# Patient Record
Sex: MALE | Race: WHITE | NOT HISPANIC OR LATINO | Employment: UNEMPLOYED | URBAN - METROPOLITAN AREA
[De-identification: names, ages, dates, MRNs, and addresses within clinical notes are randomized per-mention and may not be internally consistent; named-entity substitution may affect disease eponyms.]

---

## 2019-01-11 PROCEDURE — 88305 TISSUE EXAM BY PATHOLOGIST: CPT | Performed by: PATHOLOGY

## 2019-01-14 ENCOUNTER — LAB REQUISITION (OUTPATIENT)
Dept: LAB | Facility: HOSPITAL | Age: 69
End: 2019-01-14
Payer: COMMERCIAL

## 2019-01-14 DIAGNOSIS — D49.2 NEOPLASM OF UNSPECIFIED BEHAVIOR OF BONE, SOFT TISSUE, AND SKIN: ICD-10-CM

## 2019-11-05 LAB — HBA1C MFR BLD HPLC: 7.7 %

## 2019-11-15 ENCOUNTER — OFFICE VISIT (OUTPATIENT)
Dept: UROLOGY | Facility: MEDICAL CENTER | Age: 69
End: 2019-11-15
Payer: COMMERCIAL

## 2019-11-15 VITALS
SYSTOLIC BLOOD PRESSURE: 152 MMHG | HEART RATE: 61 BPM | DIASTOLIC BLOOD PRESSURE: 90 MMHG | WEIGHT: 238 LBS | HEIGHT: 73 IN | BODY MASS INDEX: 31.54 KG/M2

## 2019-11-15 DIAGNOSIS — N20.0 KIDNEY STONE: ICD-10-CM

## 2019-11-15 DIAGNOSIS — N40.0 BPH WITHOUT OBSTRUCTION/LOWER URINARY TRACT SYMPTOMS: Primary | ICD-10-CM

## 2019-11-15 LAB
SL AMB  POCT GLUCOSE, UA: NORMAL
SL AMB LEUKOCYTE ESTERASE,UA: NORMAL
SL AMB POCT BILIRUBIN,UA: NORMAL
SL AMB POCT BLOOD,UA: NORMAL
SL AMB POCT CLARITY,UA: CLEAR
SL AMB POCT COLOR,UA: YELLOW
SL AMB POCT KETONES,UA: NORMAL
SL AMB POCT NITRITE,UA: NORMAL
SL AMB POCT PH,UA: 7
SL AMB POCT SPECIFIC GRAVITY,UA: 1.02
SL AMB POCT URINE PROTEIN: NORMAL
SL AMB POCT UROBILINOGEN: 0.2

## 2019-11-15 PROCEDURE — 99203 OFFICE O/P NEW LOW 30 MIN: CPT | Performed by: UROLOGY

## 2019-11-15 PROCEDURE — 81003 URINALYSIS AUTO W/O SCOPE: CPT | Performed by: UROLOGY

## 2019-11-15 RX ORDER — METFORMIN HYDROCHLORIDE 500 MG/1
1000 TABLET, EXTENDED RELEASE ORAL 2 TIMES DAILY
Refills: 3 | COMMUNITY
Start: 2019-08-12

## 2019-11-15 RX ORDER — TAMSULOSIN HYDROCHLORIDE 0.4 MG/1
0.4 CAPSULE ORAL EVERY EVENING
Qty: 30 CAPSULE | Refills: 1 | Status: SHIPPED | OUTPATIENT
Start: 2019-11-15 | End: 2020-01-11

## 2019-11-15 RX ORDER — AMLODIPINE BESYLATE AND BENAZEPRIL HYDROCHLORIDE 5; 10 MG/1; MG/1
1 CAPSULE ORAL DAILY
Refills: 3 | COMMUNITY
Start: 2019-08-14 | End: 2020-02-12 | Stop reason: HOSPADM

## 2019-11-15 RX ORDER — PRAVASTATIN SODIUM 40 MG
40 TABLET ORAL DAILY
Refills: 3 | COMMUNITY
Start: 2019-09-16

## 2019-11-15 RX ORDER — AZITHROMYCIN 250 MG/1
TABLET, FILM COATED ORAL
Refills: 0 | Status: ON HOLD | COMMUNITY
Start: 2019-09-09 | End: 2020-02-06 | Stop reason: ALTCHOICE

## 2019-11-15 RX ORDER — ASPIRIN 81 MG/1
81 TABLET ORAL DAILY
COMMUNITY

## 2019-11-15 NOTE — PROGRESS NOTES
Assessment/Plan:    BPH without obstruction/lower urinary tract symptoms  Minimally symptomatic  Return in 1 year  Kidney stone  History of recurrent stones  Currently asymptomatic, but passed a stone recently  Ultrasound ordered to assess stone burden  If necessary, x-rays will be ordered  Diagnoses and all orders for this visit:    BPH without obstruction/lower urinary tract symptoms  -     tamsulosin (FLOMAX) 0 4 mg; Take 1 capsule (0 4 mg total) by mouth every evening    Kidney stone  -     POCT urine dip auto non-scope  -     US retroperitoneal / kidney limited; Future    Other orders  -     amLODIPine-benazepril (LOTREL 5-10) 5-10 MG per capsule; Take 1 capsule by mouth daily  -     azithromycin (ZITHROMAX) 250 mg tablet; TAKE TWO TABLETS BY MOUTH AS ONE DOSE ON THE FIRST DAY, THEN TAKE ONE TABLET DAILY THEREAFTER  -     metFORMIN (GLUCOPHAGE-XR) 500 mg 24 hr tablet; Take 1,000 mg by mouth 2 (two) times a day  -     pravastatin (PRAVACHOL) 40 mg tablet; Take 40 mg by mouth daily  -     aspirin (ECOTRIN LOW STRENGTH) 81 mg EC tablet; Take 81 mg by mouth daily          Subjective:      Patient ID: Violet Minaya is a 71 y o  male  HPI   Ureteral Stone:  Passed a stone at work on Nov 13  Little pain  Pt told with first stone as a teen that he has a "loop" in his kidney which traps stone debris  He passes a stone every 10-15 years, 4-5 in all  Pt caught and lost the stone  Prior stones were "calcium"  No recent imaging  BPH:  He notes urinary frequency and nocturia x 2  Caffeine causes frequency  He denies other significant urinary symptoms  He denies gross hematuria, urinary tract infections or incontinence  He is taking neither medications nor supplements for his symptoms  PSA:  We will obtain a report from the patient's PCP      AUA SYMPTOM SCORE      Most Recent Value   AUA SYMPTOM SCORE   How often have you had a sensation of not emptying your bladder completely after you finished urinating? 0   How often have you had to urinate again less than two hours after you finished urinating? 2   How often have you found you stopped and started again several times when you urinate?  0   How often have you found it difficult to postpone urination? 0   How often have you had a weak urinary stream?  1   How often have you had to push or strain to begin urination? 0   How many times did you most typically get up to urinate from the time you went to bed at night until the time you got up in the morning? 2   Quality of Life: If you were to spend the rest of your life with your urinary condition just the way it is now, how would you feel about that?  3   AUA SYMPTOM SCORE  5          The following portions of the patient's history were reviewed and updated as appropriate: allergies, current medications, past family history, past medical history, past social history, past surgical history and problem list     Review of Systems   Constitutional: Negative for activity change and fatigue  Respiratory: Negative for shortness of breath and wheezing  Cardiovascular: Negative for chest pain  Hypertension  Gastrointestinal: Negative for abdominal pain  Endocrine:        NIDDM  Genitourinary: Negative for difficulty urinating, dysuria, frequency, hematuria and urgency  Musculoskeletal: Negative for back pain and gait problem  Skin: Negative  Allergic/Immunologic: Negative  Neurological: Negative  Psychiatric/Behavioral: Negative  Objective:      /90 (BP Location: Left arm, Patient Position: Sitting, Cuff Size: Adult)   Pulse 61   Ht 6' 1" (1 854 m)   Wt 108 kg (238 lb)   BMI 31 40 kg/m²          Physical Exam   Constitutional: He is oriented to person, place, and time  He appears well-developed and well-nourished  HENT:   Head: Normocephalic and atraumatic  Eyes: EOM are normal    Neck: Normal range of motion  Neck supple     Pulmonary/Chest: Effort normal    Genitourinary: Rectum normal    Genitourinary Comments: The prostate is 40 grams, firm, smooth and non-tender   Musculoskeletal: Normal range of motion  Neurological: He is alert and oriented to person, place, and time  Skin: Skin is warm and dry  Psychiatric: He has a normal mood and affect   His behavior is normal  Judgment and thought content normal

## 2019-11-16 PROBLEM — N40.0 BPH WITHOUT OBSTRUCTION/LOWER URINARY TRACT SYMPTOMS: Status: ACTIVE | Noted: 2019-11-16

## 2019-11-16 PROBLEM — N20.0 KIDNEY STONE: Status: ACTIVE | Noted: 2019-11-16

## 2019-11-16 NOTE — ASSESSMENT & PLAN NOTE
History of recurrent stones  Currently asymptomatic, but passed a stone recently  Ultrasound ordered to assess stone burden  If necessary, x-rays will be ordered

## 2019-11-19 ENCOUNTER — HOSPITAL ENCOUNTER (OUTPATIENT)
Dept: ULTRASOUND IMAGING | Facility: HOSPITAL | Age: 69
Discharge: HOME | End: 2019-11-19
Attending: UROLOGY
Payer: COMMERCIAL

## 2019-11-19 DIAGNOSIS — N20.0 KIDNEY STONE: ICD-10-CM

## 2019-11-19 PROCEDURE — 76770 US EXAM ABDO BACK WALL COMP: CPT

## 2019-11-20 ENCOUNTER — TELEPHONE (OUTPATIENT)
Dept: UROLOGY | Facility: MEDICAL CENTER | Age: 69
End: 2019-11-20

## 2019-11-20 DIAGNOSIS — N20.0 KIDNEY STONE: Primary | ICD-10-CM

## 2019-11-20 NOTE — TELEPHONE ENCOUNTER
Called central scheduling, scheduled for 11/24/19 at 3:30pm at [facility] on [address]  I called patient, confirmed this appointment and informed patient of 3 hour food fast but clear liquids ok  No questions at this time

## 2019-11-20 NOTE — TELEPHONE ENCOUNTER
----- Message from Carmelina Castleman, MD sent at 11/20/2019  8:03 AM EST -----  Renal ultrasound shows extensive stone disease in the left kidney in and stone debris in the bladder  Several benign appearing cysts are also noted  Please obtain a CT stone study prior to the patient's next visit with me  Thank you  CT scan ordered

## 2019-11-30 ENCOUNTER — HOSPITAL ENCOUNTER (OUTPATIENT)
Dept: CT IMAGING | Facility: HOSPITAL | Age: 69
Discharge: HOME | End: 2019-11-30
Attending: UROLOGY
Payer: COMMERCIAL

## 2019-11-30 DIAGNOSIS — N20.0 KIDNEY STONE: ICD-10-CM

## 2019-11-30 PROCEDURE — 74176 CT ABD & PELVIS W/O CONTRAST: CPT

## 2019-12-03 ENCOUNTER — TELEPHONE (OUTPATIENT)
Dept: UROLOGY | Facility: AMBULATORY SURGERY CENTER | Age: 69
End: 2019-12-03

## 2019-12-03 ENCOUNTER — TELEPHONE (OUTPATIENT)
Dept: UROLOGY | Facility: MEDICAL CENTER | Age: 69
End: 2019-12-03

## 2019-12-03 NOTE — TELEPHONE ENCOUNTER
----- Message from Marcelle Medina MD sent at 12/3/2019  8:31 AM EST -----  The patient has stones in his left kidney and his bladder  Ask him to make an office appointment to discuss treatment  Thank you  LM for pt to call office    Pt needs to make an appointment

## 2019-12-04 NOTE — TELEPHONE ENCOUNTER
"Requested Prescriptions   Pending Prescriptions Disp Refills     metoprolol tartrate (LOPRESSOR) 50 MG tablet [Pharmacy Med Name: METOPROLOL TARTRATE 50 MG TAB]  Last Written Prescription Date:  05/09/2018  Last Fill Quantity: 180,  # refills: 01   Last Office Visit: 5/15/2018   Future Office Visit:      180 tablet 1     Sig: TAKE 1 TABLET (50 MG) BY MOUTH 2 TIMES DAILY    Beta-Blockers Protocol Passed    11/1/2018  2:04 AM       Passed - Blood pressure under 140/90 in past 12 months    BP Readings from Last 3 Encounters:   07/31/18 128/80   05/15/18 124/84   05/09/18 140/84                Passed - Patient is age 6 or older       Passed - Recent (12 mo) or future (30 days) visit within the authorizing provider's specialty    Patient had office visit in the last 12 months or has a visit in the next 30 days with authorizing provider or within the authorizing provider's specialty.  See \"Patient Info\" tab in inbasket, or \"Choose Columns\" in Meds & Orders section of the refill encounter.                " Patient called requesting results of recent CT renal stone study  I advised him that once the doctor reviews them, he will be getting a call from a clinical staff member  He understood and did not have any further questions  He can be reached at [phone]

## 2019-12-17 NOTE — TELEPHONE ENCOUNTER
Patient called to make an appointment  I do not see anything for the month of December  Please call patient back at [phone]

## 2020-01-10 DIAGNOSIS — N40.0 BPH WITHOUT OBSTRUCTION/LOWER URINARY TRACT SYMPTOMS: ICD-10-CM

## 2020-01-11 RX ORDER — TAMSULOSIN HYDROCHLORIDE 0.4 MG/1
CAPSULE ORAL
Qty: 30 CAPSULE | Refills: 0 | Status: SHIPPED | OUTPATIENT
Start: 2020-01-11

## 2020-01-15 ENCOUNTER — OFFICE VISIT (OUTPATIENT)
Dept: UROLOGY | Facility: CLINIC | Age: 70
End: 2020-01-15
Payer: COMMERCIAL

## 2020-01-15 VITALS
WEIGHT: 237 LBS | DIASTOLIC BLOOD PRESSURE: 84 MMHG | SYSTOLIC BLOOD PRESSURE: 140 MMHG | HEIGHT: 73 IN | HEART RATE: 59 BPM | BODY MASS INDEX: 31.41 KG/M2

## 2020-01-15 DIAGNOSIS — N40.0 BPH WITHOUT OBSTRUCTION/LOWER URINARY TRACT SYMPTOMS: Primary | ICD-10-CM

## 2020-01-15 DIAGNOSIS — N20.0 KIDNEY STONE: ICD-10-CM

## 2020-01-15 DIAGNOSIS — N21.0 BLADDER STONES: ICD-10-CM

## 2020-01-15 PROCEDURE — 81003 URINALYSIS AUTO W/O SCOPE: CPT | Performed by: UROLOGY

## 2020-01-15 PROCEDURE — 99215 OFFICE O/P EST HI 40 MIN: CPT | Performed by: UROLOGY

## 2020-01-15 NOTE — H&P (VIEW-ONLY)
Assessment/Plan:    No problem-specific Assessment & Plan notes found for this encounter  Diagnoses and all orders for this visit:    BPH without obstruction/lower urinary tract symptoms    Kidney stone  -     Case request operating room: CYSTOSCOPY URETEROSCOPY WITH LITHOTRIPSY HOLMIUM LASER, RETROGRADE PYELOGRAM AND INSERTION STENT URETERAL,Cystolothopaxy; Standing  -     Case request operating room: CYSTOSCOPY URETEROSCOPY WITH LITHOTRIPSY HOLMIUM LASER, RETROGRADE PYELOGRAM AND INSERTION STENT URETERAL,Cystolothopaxy    Bladder stones    Other orders  -     Diet NPO; Sips with meds; Standing  -     Place sequential compression device; Standing  -     Comprehensive metabolic panel; Standing  -     CBC and Platelet; Standing  -     UA (URINE) with reflex to Scope; Standing  -     Electrocardiogram, 12-lead; Standing  -     ceFAZolin (ANCEF) 2,000 mg in dextrose 5 % 100 mL IVPB          Subjective:      Patient ID: Hanh Gonzalez is a 71 y o  male  HPI  Renal calculi:  Stones in left kidney are 700-1200 Hounsfield units in would be visible on KUB  There is 1 stone at the UPJ measuring approximately 15 mm and causing obstruction  Other minor calcifications in the parenchyma also noted  The patient has passed a number of ureteral stone spontaneously  The only time he required endoscopic stone removal was at the age of 16, that is about 50 years ago  This large stone needs to be fragmented to relieve the obstruction to the kidney  Bladder   calculi:  There are 2 stones in the bladder, each 800-1400 Hounsfield units which would be visible on KUB  The patient notes variability in urinary stream and at times it will abruptly slow down  This is suggestive of the stone rolling over the bladder neck causing outlet obstruction  The presence of bladder stones is generally indicative of urinary retention and outflow obstruction  The stones need to be removed endoscopically    The patient will be scheduled  The following portions of the patient's history were reviewed and updated as appropriate: allergies, current medications, past family history, past medical history, past social history, past surgical history and problem list     Review of Systems   Constitutional: Negative for activity change and fatigue  Respiratory: Negative for shortness of breath and wheezing  Cardiovascular: Negative for chest pain  Hypertension  Gastrointestinal: Negative for abdominal pain  Endocrine:        NIDDM  Genitourinary: Negative for difficulty urinating, dysuria, frequency, hematuria and urgency  Musculoskeletal: Negative for back pain and gait problem  Skin: Negative  Allergic/Immunologic: Negative  Neurological: Negative  Psychiatric/Behavioral: Negative  Objective:      /84   Pulse 59   Ht 6' 1" (1 854 m)   Wt 108 kg (237 lb)   BMI 31 27 kg/m²           Physical Exam   Constitutional: He is oriented to person, place, and time  He appears well-developed and well-nourished  HENT:   Head: Normocephalic and atraumatic  Eyes: EOM are normal    Neck: Normal range of motion  Neck supple  Cardiovascular: Normal rate and regular rhythm  Murmur heard  Blowing systolic murmur at left sternal border  Occas dropped beat  Pulmonary/Chest: Effort normal and breath sounds normal    Abdominal: Soft  There is no tenderness  Musculoskeletal: Normal range of motion  Neurological: He is alert and oriented to person, place, and time  Skin: Skin is warm and dry  Psychiatric: He has a normal mood and affect   His behavior is normal  Judgment and thought content normal

## 2020-01-15 NOTE — PROGRESS NOTES
Assessment/Plan:    No problem-specific Assessment & Plan notes found for this encounter  Diagnoses and all orders for this visit:    BPH without obstruction/lower urinary tract symptoms    Kidney stone  -     Case request operating room: CYSTOSCOPY URETEROSCOPY WITH LITHOTRIPSY HOLMIUM LASER, RETROGRADE PYELOGRAM AND INSERTION STENT URETERAL,Cystolothopaxy; Standing  -     Case request operating room: CYSTOSCOPY URETEROSCOPY WITH LITHOTRIPSY HOLMIUM LASER, RETROGRADE PYELOGRAM AND INSERTION STENT URETERAL,Cystolothopaxy    Bladder stones    Other orders  -     Diet NPO; Sips with meds; Standing  -     Place sequential compression device; Standing  -     Comprehensive metabolic panel; Standing  -     CBC and Platelet; Standing  -     UA (URINE) with reflex to Scope; Standing  -     Electrocardiogram, 12-lead; Standing  -     ceFAZolin (ANCEF) 2,000 mg in dextrose 5 % 100 mL IVPB          Subjective:      Patient ID: Jose Valdez is a 71 y o  male  HPI  Renal calculi:  Stones in left kidney are 700-1200 Hounsfield units in would be visible on KUB  There is 1 stone at the UPJ measuring approximately 15 mm and causing obstruction  Other minor calcifications in the parenchyma also noted  The patient has passed a number of ureteral stone spontaneously  The only time he required endoscopic stone removal was at the age of 16, that is about 50 years ago  This large stone needs to be fragmented to relieve the obstruction to the kidney  Bladder   calculi:  There are 2 stones in the bladder, each 800-1400 Hounsfield units which would be visible on KUB  The patient notes variability in urinary stream and at times it will abruptly slow down  This is suggestive of the stone rolling over the bladder neck causing outlet obstruction  The presence of bladder stones is generally indicative of urinary retention and outflow obstruction  The stones need to be removed endoscopically    The patient will be scheduled  The following portions of the patient's history were reviewed and updated as appropriate: allergies, current medications, past family history, past medical history, past social history, past surgical history and problem list     Review of Systems   Constitutional: Negative for activity change and fatigue  Respiratory: Negative for shortness of breath and wheezing  Cardiovascular: Negative for chest pain  Hypertension  Gastrointestinal: Negative for abdominal pain  Endocrine:        NIDDM  Genitourinary: Negative for difficulty urinating, dysuria, frequency, hematuria and urgency  Musculoskeletal: Negative for back pain and gait problem  Skin: Negative  Allergic/Immunologic: Negative  Neurological: Negative  Psychiatric/Behavioral: Negative  Objective:      /84   Pulse 59   Ht 6' 1" (1 854 m)   Wt 108 kg (237 lb)   BMI 31 27 kg/m²          Physical Exam   Constitutional: He is oriented to person, place, and time  He appears well-developed and well-nourished  HENT:   Head: Normocephalic and atraumatic  Eyes: EOM are normal    Neck: Normal range of motion  Neck supple  Cardiovascular: Normal rate and regular rhythm  Murmur heard  Blowing systolic murmur at left sternal border  Occas dropped beat  Pulmonary/Chest: Effort normal and breath sounds normal    Abdominal: Soft  There is no tenderness  Musculoskeletal: Normal range of motion  Neurological: He is alert and oriented to person, place, and time  Skin: Skin is warm and dry  Psychiatric: He has a normal mood and affect   His behavior is normal  Judgment and thought content normal

## 2020-01-15 NOTE — H&P
Assessment/Plan:    No problem-specific Assessment & Plan notes found for this encounter  Diagnoses and all orders for this visit:    BPH without obstruction/lower urinary tract symptoms    Kidney stone  -     Case request operating room: CYSTOSCOPY URETEROSCOPY WITH LITHOTRIPSY HOLMIUM LASER, RETROGRADE PYELOGRAM AND INSERTION STENT URETERAL,Cystolothopaxy; Standing  -     Case request operating room: CYSTOSCOPY URETEROSCOPY WITH LITHOTRIPSY HOLMIUM LASER, RETROGRADE PYELOGRAM AND INSERTION STENT URETERAL,Cystolothopaxy    Bladder stones    Other orders  -     Diet NPO; Sips with meds; Standing  -     Place sequential compression device; Standing  -     Comprehensive metabolic panel; Standing  -     CBC and Platelet; Standing  -     UA (URINE) with reflex to Scope; Standing  -     Electrocardiogram, 12-lead; Standing  -     ceFAZolin (ANCEF) 2,000 mg in dextrose 5 % 100 mL IVPB          Subjective:      Patient ID: James Willis is a 71 y o  male  HPI  Renal calculi:  Stones in left kidney are 700-1200 Hounsfield units in would be visible on KUB  There is 1 stone at the UPJ measuring approximately 15 mm and causing obstruction  Other minor calcifications in the parenchyma also noted  The patient has passed a number of ureteral stone spontaneously  The only time he required endoscopic stone removal was at the age of 16, that is about 50 years ago  This large stone needs to be fragmented to relieve the obstruction to the kidney  Bladder   calculi:  There are 2 stones in the bladder, each 800-1400 Hounsfield units which would be visible on KUB  The patient notes variability in urinary stream and at times it will abruptly slow down  This is suggestive of the stone rolling over the bladder neck causing outlet obstruction  The presence of bladder stones is generally indicative of urinary retention and outflow obstruction  The stones need to be removed endoscopically    The patient will be scheduled  The following portions of the patient's history were reviewed and updated as appropriate: allergies, current medications, past family history, past medical history, past social history, past surgical history and problem list     Review of Systems   Constitutional: Negative for activity change and fatigue  Respiratory: Negative for shortness of breath and wheezing  Cardiovascular: Negative for chest pain  Hypertension  Gastrointestinal: Negative for abdominal pain  Endocrine:        NIDDM  Genitourinary: Negative for difficulty urinating, dysuria, frequency, hematuria and urgency  Musculoskeletal: Negative for back pain and gait problem  Skin: Negative  Allergic/Immunologic: Negative  Neurological: Negative  Psychiatric/Behavioral: Negative  Objective:      /84   Pulse 59   Ht 6' 1" (1 854 m)   Wt 108 kg (237 lb)   BMI 31 27 kg/m²           Physical Exam   Constitutional: He is oriented to person, place, and time  He appears well-developed and well-nourished  HENT:   Head: Normocephalic and atraumatic  Eyes: EOM are normal    Neck: Normal range of motion  Neck supple  Cardiovascular: Normal rate and regular rhythm  Murmur heard  Blowing systolic murmur at left sternal border  Occas dropped beat  Pulmonary/Chest: Effort normal and breath sounds normal    Abdominal: Soft  There is no tenderness  Musculoskeletal: Normal range of motion  Neurological: He is alert and oriented to person, place, and time  Skin: Skin is warm and dry  Psychiatric: He has a normal mood and affect   His behavior is normal  Judgment and thought content normal

## 2020-01-16 ENCOUNTER — TELEPHONE (OUTPATIENT)
Dept: UROLOGY | Facility: MEDICAL CENTER | Age: 70
End: 2020-01-16

## 2020-01-17 NOTE — TELEPHONE ENCOUNTER
(s)  Belén Whitley    I spoke to the patient and scheduled his procedure for 2/4/2020 with Dr Yolanda De La Torre at [facility]      -instructions given verbally and mailed  -patient will have CBC, CMP, Urine C&S and EKG 10 days prior  -patient will stop any potentially blood thinning meds 7 days prior  -no clearances ordered  -no auth required for [facility]

## 2020-01-28 ENCOUNTER — APPOINTMENT (OUTPATIENT)
Dept: LAB | Facility: HOSPITAL | Age: 70
End: 2020-01-28
Attending: UROLOGY
Payer: COMMERCIAL

## 2020-01-28 ENCOUNTER — HOSPITAL ENCOUNTER (OUTPATIENT)
Dept: NON INVASIVE DIAGNOSTICS | Facility: HOSPITAL | Age: 70
Discharge: HOME | End: 2020-01-28
Attending: UROLOGY
Payer: COMMERCIAL

## 2020-01-28 DIAGNOSIS — N21.0 BLADDER STONES: ICD-10-CM

## 2020-01-28 DIAGNOSIS — N20.0 KIDNEY STONE: ICD-10-CM

## 2020-01-28 LAB
ALBUMIN SERPL BCP-MCNC: 4.4 G/DL (ref 3.5–5)
ALP SERPL-CCNC: 75 U/L (ref 46–116)
ALT SERPL W P-5'-P-CCNC: 50 U/L (ref 12–78)
ANION GAP SERPL CALCULATED.3IONS-SCNC: 9 MMOL/L (ref 4–13)
AST SERPL W P-5'-P-CCNC: 26 U/L (ref 5–45)
ATRIAL RATE: 69 BPM
BASOPHILS # BLD AUTO: 0.06 THOUSANDS/ΜL (ref 0–0.1)
BASOPHILS NFR BLD AUTO: 1 % (ref 0–1)
BILIRUB SERPL-MCNC: 0.52 MG/DL (ref 0.2–1)
BUN SERPL-MCNC: 24 MG/DL (ref 5–25)
CALCIUM SERPL-MCNC: 9.7 MG/DL (ref 8.3–10.1)
CHLORIDE SERPL-SCNC: 103 MMOL/L (ref 100–108)
CO2 SERPL-SCNC: 31 MMOL/L (ref 21–32)
CREAT SERPL-MCNC: 1.33 MG/DL (ref 0.6–1.3)
EOSINOPHIL # BLD AUTO: 0.37 THOUSAND/ΜL (ref 0–0.61)
EOSINOPHIL NFR BLD AUTO: 4 % (ref 0–6)
ERYTHROCYTE [DISTWIDTH] IN BLOOD BY AUTOMATED COUNT: 12.6 % (ref 11.6–15.1)
GFR SERPL CREATININE-BSD FRML MDRD: 54 ML/MIN/1.73SQ M
GLUCOSE SERPL-MCNC: 127 MG/DL (ref 65–140)
HCT VFR BLD AUTO: 47.4 % (ref 36.5–49.3)
HGB BLD-MCNC: 15 G/DL (ref 12–17)
IMM GRANULOCYTES # BLD AUTO: 0.02 THOUSAND/UL (ref 0–0.2)
IMM GRANULOCYTES NFR BLD AUTO: 0 % (ref 0–2)
LYMPHOCYTES # BLD AUTO: 1.77 THOUSANDS/ΜL (ref 0.6–4.47)
LYMPHOCYTES NFR BLD AUTO: 21 % (ref 14–44)
MCH RBC QN AUTO: 29.6 PG (ref 26.8–34.3)
MCHC RBC AUTO-ENTMCNC: 31.6 G/DL (ref 31.4–37.4)
MCV RBC AUTO: 94 FL (ref 82–98)
MONOCYTES # BLD AUTO: 0.69 THOUSAND/ΜL (ref 0.17–1.22)
MONOCYTES NFR BLD AUTO: 8 % (ref 4–12)
NEUTROPHILS # BLD AUTO: 5.6 THOUSANDS/ΜL (ref 1.85–7.62)
NEUTS SEG NFR BLD AUTO: 66 % (ref 43–75)
NRBC BLD AUTO-RTO: 0 /100 WBCS
P AXIS: 57 DEGREES
PLATELET # BLD AUTO: 275 THOUSANDS/UL (ref 149–390)
PMV BLD AUTO: 9.5 FL (ref 8.9–12.7)
POTASSIUM SERPL-SCNC: 4.5 MMOL/L (ref 3.5–5.3)
PR INTERVAL: 176 MS
PROT SERPL-MCNC: 7.8 G/DL (ref 6.4–8.2)
QRS AXIS: 7 DEGREES
QRSD INTERVAL: 102 MS
QT INTERVAL: 386 MS
QTC INTERVAL: 413 MS
RBC # BLD AUTO: 5.07 MILLION/UL (ref 3.88–5.62)
SODIUM SERPL-SCNC: 143 MMOL/L (ref 136–145)
T WAVE AXIS: 14 DEGREES
VENTRICULAR RATE: 69 BPM
WBC # BLD AUTO: 8.51 THOUSAND/UL (ref 4.31–10.16)

## 2020-01-28 PROCEDURE — 80053 COMPREHEN METABOLIC PANEL: CPT

## 2020-01-28 PROCEDURE — 93005 ELECTROCARDIOGRAM TRACING: CPT

## 2020-01-28 PROCEDURE — 87086 URINE CULTURE/COLONY COUNT: CPT

## 2020-01-28 PROCEDURE — 36415 COLL VENOUS BLD VENIPUNCTURE: CPT

## 2020-01-28 PROCEDURE — 93010 ELECTROCARDIOGRAM REPORT: CPT | Performed by: INTERNAL MEDICINE

## 2020-01-28 PROCEDURE — 85025 COMPLETE CBC W/AUTO DIFF WBC: CPT

## 2020-01-29 LAB — BACTERIA UR CULT: NORMAL

## 2020-02-04 RX ORDER — MULTIVITAMIN
1 TABLET ORAL DAILY
COMMUNITY

## 2020-02-04 NOTE — PRE-PROCEDURE INSTRUCTIONS
Pre-Surgery Instructions:   Medication Instructions    amLODIPine-benazepril (LOTREL 5-10) 5-10 MG per capsule Patient was instructed by Physician and understands   aspirin (ECOTRIN LOW STRENGTH) 81 mg EC tablet Patient was instructed by Physician and understands   Glucosamine HCl (GLUCOSAMINE PO) Patient was instructed by Physician and understands   metFORMIN (GLUCOPHAGE-XR) 500 mg 24 hr tablet Patient was instructed by Physician and understands   Multiple Vitamin (MULTIVITAMIN) tablet Patient was instructed by Physician and understands   pravastatin (PRAVACHOL) 40 mg tablet Patient was instructed by Physician and understands   Saw Palmetto, Serenoa repens, (SAW PALMETTO BERRY PO) Patient was instructed by Physician and understands   tamsulosin (FLOMAX) 0 4 mg Patient was instructed by Physician and understands  [facility] preop instructions reviewed with pt  Pt will get dial antibacterial soap

## 2020-02-05 ENCOUNTER — ANESTHESIA EVENT (OUTPATIENT)
Dept: PERIOP | Facility: HOSPITAL | Age: 70
End: 2020-02-05
Payer: COMMERCIAL

## 2020-02-06 ENCOUNTER — TELEPHONE (OUTPATIENT)
Dept: UROLOGY | Facility: MEDICAL CENTER | Age: 70
End: 2020-02-06

## 2020-02-06 ENCOUNTER — APPOINTMENT (OUTPATIENT)
Dept: RADIOLOGY | Facility: HOSPITAL | Age: 70
End: 2020-02-06
Payer: COMMERCIAL

## 2020-02-06 ENCOUNTER — ANESTHESIA (OUTPATIENT)
Dept: PERIOP | Facility: HOSPITAL | Age: 70
End: 2020-02-06
Payer: COMMERCIAL

## 2020-02-06 ENCOUNTER — HOSPITAL ENCOUNTER (OUTPATIENT)
Facility: HOSPITAL | Age: 70
Setting detail: OUTPATIENT SURGERY
Discharge: HOME | End: 2020-02-06
Attending: UROLOGY | Admitting: UROLOGY
Payer: COMMERCIAL

## 2020-02-06 VITALS
OXYGEN SATURATION: 93 % | SYSTOLIC BLOOD PRESSURE: 163 MMHG | BODY MASS INDEX: 31.81 KG/M2 | HEIGHT: 73 IN | WEIGHT: 240 LBS | TEMPERATURE: 98.7 F | RESPIRATION RATE: 16 BRPM | HEART RATE: 70 BPM | DIASTOLIC BLOOD PRESSURE: 86 MMHG

## 2020-02-06 DIAGNOSIS — N21.0 BLADDER STONES: Primary | ICD-10-CM

## 2020-02-06 DIAGNOSIS — N20.0 KIDNEY STONE: ICD-10-CM

## 2020-02-06 DIAGNOSIS — N20.0 RENAL CALCULUS, LEFT: ICD-10-CM

## 2020-02-06 LAB — GLUCOSE SERPL-MCNC: 153 MG/DL (ref 65–140)

## 2020-02-06 PROCEDURE — 52317 REMOVE BLADDER STONE: CPT | Performed by: UROLOGY

## 2020-02-06 PROCEDURE — 82360 CALCULUS ASSAY QUANT: CPT | Performed by: UROLOGY

## 2020-02-06 PROCEDURE — C1769 GUIDE WIRE: HCPCS | Performed by: UROLOGY

## 2020-02-06 PROCEDURE — NC001 PR NO CHARGE: Performed by: UROLOGY

## 2020-02-06 PROCEDURE — 82948 REAGENT STRIP/BLOOD GLUCOSE: CPT

## 2020-02-06 PROCEDURE — C1894 INTRO/SHEATH, NON-LASER: HCPCS | Performed by: UROLOGY

## 2020-02-06 PROCEDURE — 52356 CYSTO/URETERO W/LITHOTRIPSY: CPT | Performed by: UROLOGY

## 2020-02-06 PROCEDURE — C2617 STENT, NON-COR, TEM W/O DEL: HCPCS | Performed by: UROLOGY

## 2020-02-06 PROCEDURE — 74420 UROGRAPHY RTRGR +-KUB: CPT

## 2020-02-06 DEVICE — VARIABLE LENGTH INJECTION STENT SET
Type: IMPLANTABLE DEVICE | Site: URETER | Status: FUNCTIONAL
Brand: CONTOUR VL™ INJECTION STENT SET

## 2020-02-06 RX ORDER — PHENAZOPYRIDINE HYDROCHLORIDE 200 MG/1
200 TABLET, FILM COATED ORAL ONCE AS NEEDED
Status: DISCONTINUED | OUTPATIENT
Start: 2020-02-06 | End: 2020-02-06 | Stop reason: HOSPADM

## 2020-02-06 RX ORDER — OXYCODONE HYDROCHLORIDE 5 MG/1
5 TABLET ORAL EVERY 4 HOURS PRN
Status: DISCONTINUED | OUTPATIENT
Start: 2020-02-06 | End: 2020-02-06 | Stop reason: HOSPADM

## 2020-02-06 RX ORDER — PHENAZOPYRIDINE HYDROCHLORIDE 200 MG/1
200 TABLET, FILM COATED ORAL 3 TIMES DAILY PRN
Qty: 10 TABLET | Refills: 0 | Status: SHIPPED | OUTPATIENT
Start: 2020-02-06 | End: 2020-02-12 | Stop reason: HOSPADM

## 2020-02-06 RX ORDER — FENTANYL CITRATE 50 UG/ML
INJECTION, SOLUTION INTRAMUSCULAR; INTRAVENOUS AS NEEDED
Status: DISCONTINUED | OUTPATIENT
Start: 2020-02-06 | End: 2020-02-06 | Stop reason: SURG

## 2020-02-06 RX ORDER — HYDROMORPHONE HCL/PF 1 MG/ML
SYRINGE (ML) INJECTION AS NEEDED
Status: DISCONTINUED | OUTPATIENT
Start: 2020-02-06 | End: 2020-02-06 | Stop reason: SURG

## 2020-02-06 RX ORDER — ONDANSETRON 2 MG/ML
INJECTION INTRAMUSCULAR; INTRAVENOUS AS NEEDED
Status: DISCONTINUED | OUTPATIENT
Start: 2020-02-06 | End: 2020-02-06 | Stop reason: SURG

## 2020-02-06 RX ORDER — FENTANYL CITRATE/PF 50 MCG/ML
50 SYRINGE (ML) INJECTION
Status: DISCONTINUED | OUTPATIENT
Start: 2020-02-06 | End: 2020-02-06 | Stop reason: HOSPADM

## 2020-02-06 RX ORDER — MIDAZOLAM HYDROCHLORIDE 2 MG/2ML
INJECTION, SOLUTION INTRAMUSCULAR; INTRAVENOUS AS NEEDED
Status: DISCONTINUED | OUTPATIENT
Start: 2020-02-06 | End: 2020-02-06 | Stop reason: SURG

## 2020-02-06 RX ORDER — ACETAMINOPHEN 325 MG/1
650 TABLET ORAL EVERY 6 HOURS PRN
Status: DISCONTINUED | OUTPATIENT
Start: 2020-02-06 | End: 2020-02-06 | Stop reason: HOSPADM

## 2020-02-06 RX ORDER — ONDANSETRON 2 MG/ML
4 INJECTION INTRAMUSCULAR; INTRAVENOUS EVERY 6 HOURS PRN
Status: DISCONTINUED | OUTPATIENT
Start: 2020-02-06 | End: 2020-02-06 | Stop reason: HOSPADM

## 2020-02-06 RX ORDER — ONDANSETRON 2 MG/ML
4 INJECTION INTRAMUSCULAR; INTRAVENOUS ONCE AS NEEDED
Status: DISCONTINUED | OUTPATIENT
Start: 2020-02-06 | End: 2020-02-06 | Stop reason: HOSPADM

## 2020-02-06 RX ORDER — DEXAMETHASONE SODIUM PHOSPHATE 4 MG/ML
INJECTION, SOLUTION INTRA-ARTICULAR; INTRALESIONAL; INTRAMUSCULAR; INTRAVENOUS; SOFT TISSUE AS NEEDED
Status: DISCONTINUED | OUTPATIENT
Start: 2020-02-06 | End: 2020-02-06 | Stop reason: SURG

## 2020-02-06 RX ORDER — PROPOFOL 10 MG/ML
INJECTION, EMULSION INTRAVENOUS AS NEEDED
Status: DISCONTINUED | OUTPATIENT
Start: 2020-02-06 | End: 2020-02-06 | Stop reason: SURG

## 2020-02-06 RX ORDER — OXYCODONE HYDROCHLORIDE 5 MG/1
TABLET ORAL
Qty: 8 TABLET | Refills: 0 | Status: SHIPPED | OUTPATIENT
Start: 2020-02-06

## 2020-02-06 RX ORDER — MAGNESIUM HYDROXIDE 1200 MG/15ML
LIQUID ORAL AS NEEDED
Status: DISCONTINUED | OUTPATIENT
Start: 2020-02-06 | End: 2020-02-06 | Stop reason: HOSPADM

## 2020-02-06 RX ORDER — SODIUM CHLORIDE 9 MG/ML
125 INJECTION, SOLUTION INTRAVENOUS CONTINUOUS
Status: DISCONTINUED | OUTPATIENT
Start: 2020-02-06 | End: 2020-02-06 | Stop reason: HOSPADM

## 2020-02-06 RX ADMIN — FENTANYL CITRATE 50 MCG: 50 INJECTION, SOLUTION INTRAMUSCULAR; INTRAVENOUS at 12:41

## 2020-02-06 RX ADMIN — Medication 2000 MG: at 10:47

## 2020-02-06 RX ADMIN — HYDROMORPHONE HYDROCHLORIDE 0.5 MG: 1 INJECTION, SOLUTION INTRAMUSCULAR; INTRAVENOUS; SUBCUTANEOUS at 11:18

## 2020-02-06 RX ADMIN — PROPOFOL 200 MG: 10 INJECTION, EMULSION INTRAVENOUS at 10:54

## 2020-02-06 RX ADMIN — DEXAMETHASONE SODIUM PHOSPHATE 4 MG: 4 INJECTION, SOLUTION INTRAMUSCULAR; INTRAVENOUS at 11:05

## 2020-02-06 RX ADMIN — SODIUM CHLORIDE 125 ML/HR: 0.9 INJECTION, SOLUTION INTRAVENOUS at 09:16

## 2020-02-06 RX ADMIN — FENTANYL CITRATE 100 MCG: 50 INJECTION, SOLUTION INTRAMUSCULAR; INTRAVENOUS at 11:40

## 2020-02-06 RX ADMIN — FENTANYL CITRATE 25 MCG: 50 INJECTION, SOLUTION INTRAMUSCULAR; INTRAVENOUS at 12:21

## 2020-02-06 RX ADMIN — SODIUM CHLORIDE: 0.9 INJECTION, SOLUTION INTRAVENOUS at 11:18

## 2020-02-06 RX ADMIN — FENTANYL CITRATE 50 MCG: 50 INJECTION, SOLUTION INTRAMUSCULAR; INTRAVENOUS at 11:05

## 2020-02-06 RX ADMIN — LIDOCAINE HYDROCHLORIDE 50 MG: 20 INJECTION, SOLUTION INTRAVENOUS at 10:54

## 2020-02-06 RX ADMIN — FENTANYL CITRATE 25 MCG: 50 INJECTION, SOLUTION INTRAMUSCULAR; INTRAVENOUS at 12:26

## 2020-02-06 RX ADMIN — FENTANYL CITRATE 50 MCG: 50 INJECTION, SOLUTION INTRAMUSCULAR; INTRAVENOUS at 11:00

## 2020-02-06 RX ADMIN — MIDAZOLAM 2 MG: 1 INJECTION INTRAMUSCULAR; INTRAVENOUS at 10:49

## 2020-02-06 RX ADMIN — ONDANSETRON 4 MG: 2 INJECTION INTRAMUSCULAR; INTRAVENOUS at 12:45

## 2020-02-06 NOTE — DISCHARGE SUMMARY
DISCHARGE SUMMARY     Patient Name: Seb Pabon    Patient MRN: [id number]    Admitting Provider: Kwaku Beckman MD    Discharging Provider: Kwaku Beckman MD    Primary Care Physician at Discharge: Neha Cole [phone]     Admission Date: 2/6/2020     Discharge Date: 2/6/2020    Admission Diagnosis   Kidney stone [N20 0]    Discharge Diagnoses  Principal Problem:    Kidney stone  Active Problems:    Bladder stones      Medications  Current Discharge Medication List      START taking these medications    Details   oxyCODONE (ROXICODONE) 5 mg immediate release tablet Take 1-2 tablets every 6 hours prn  Qty: 8 tablet, Refills: 0    Associated Diagnoses: Kidney stone; Bladder stones      phenazopyridine (PYRIDIUM) 200 mg tablet Take 1 tablet (200 mg total) by mouth 3 (three) times a day as needed for bladder spasms (Dysuria)  Qty: 10 tablet, Refills: 0    Associated Diagnoses: Bladder stones            Current Discharge Medication List      CONTINUE these medications which have NOT CHANGED    Details   amLODIPine-benazepril (LOTREL 5-10) 5-10 MG per capsule Take 1 capsule by mouth daily  Refills: 3      aspirin (ECOTRIN LOW STRENGTH) 81 mg EC tablet Take 81 mg by mouth daily      Glucosamine HCl (GLUCOSAMINE PO) Take 1 tablet by mouth daily      metFORMIN (GLUCOPHAGE-XR) 500 mg 24 hr tablet Take 1,000 mg by mouth 2 (two) times a day  Refills: 3      Multiple Vitamin (MULTIVITAMIN) tablet Take 1 tablet by mouth daily      pravastatin (PRAVACHOL) 40 mg tablet Take 40 mg by mouth daily  Refills: 3      Saw Palmetto, Serenoa repens, (SAW PALMETTO BERRY PO) Take 1 tablet by mouth daily      tamsulosin (FLOMAX) 0 4 mg TAKE ONE CAPSULE BY MOUTH IN THE EVENING  Qty: 30 capsule, Refills: 0    Associated Diagnoses: BPH without obstruction/lower urinary tract symptoms             Allergies  Allergies   Allergen Reactions    Penicillins Hives       Outpatient Follow-Up  Kwaku Beckman MD  [address] [place] [address] [facility]  [phone]    Follow up  The office will call to arrange stent removal and follow-up  ? Discharge Disposition  Home    Operative Procedures Performed  Procedure(s):  CYSTO URETEROSCOPY W/ LITHO HOLMIUM LASER, RETROGRADE PYELOGRAM AND STENT URETERAL; LASER BLADDER STONES; Cystolithopaxy  ? Physical Exam at Discharge  Condition of Patient on Discharge: stable  ?   Lorena Nguyen MD

## 2020-02-06 NOTE — DISCHARGE INSTR - AVS FIRST PAGE
Use Tylenol and ibuprofen as your 1st line pain medication  The Pyridium prescribed can help with urinary burning and discomfort  It will turn your urine orange

## 2020-02-06 NOTE — ANESTHESIA PREPROCEDURE EVALUATION
Review of Systems/Medical History  Patient summary reviewed  Chart reviewed  No history of anesthetic complications     Cardiovascular  Hyperlipidemia, Hypertension controlled,    Pulmonary  Smoker ex-smoker  ,        GI/Hepatic  Negative GI/hepatic ROS          Kidney stones, Prostatic disorder, benign prostatic hyperplasia       Endo/Other  Diabetes ,      GYN       Hematology  Negative hematology ROS      Musculoskeletal  Back pain , cervical pain,   Arthritis     Neurology  Negative neurology ROS      Psychology   Negative psychology ROS              Physical Exam    Airway    Mallampati score: II  TM Distance: >3 FB  Neck ROM: full     Dental       Cardiovascular  Rhythm: regular, Cardiovascular exam normal    Pulmonary  Pulmonary exam normal Breath sounds clear to auscultation,     Other Findings        Anesthesia Plan  ASA Score- 2     Anesthesia Type- general with ASA Monitors  Additional Monitors:   Airway Plan:         Plan Factors-Patient not instructed to abstain from smoking on day of procedure  Patient did not smoke on day of surgery  Induction- intravenous  Postoperative Plan- Plan for postoperative opioid use  Informed Consent- Anesthetic plan and risks discussed with patient

## 2020-02-06 NOTE — OP NOTE
OPERATIVE REPORT  PATIENT NAME: Jose Pacheco    :  [id number]  MRN: [id number]  Pt Location: [facility] OR ROOM 07    SURGERY DATE: 2020    Surgeon(s) and Role:     * Mindy Fuentes MD - Primary    Preop Diagnosis:  Kidney stone [N20 0]    Post-Op Diagnosis Codes:     * Kidney stone [N20 0]     * Bladder stones [N21 0]     * BPH with urinary obstruction [N40 1, N13 8]    Procedure(s) (LRB):  CYSTO URETEROSCOPY W/ LITHO HOLMIUM LASER, RETROGRADE PYELOGRAM AND STENT URETERAL; LASER BLADDER STONES; Cystolithopaxy (Left)    Specimen(s):  ID Type Source Tests Collected by Time Destination   A : Bladder stones [facility] Urinary Bladder STONE ANALYSIS Mindy Fuentes MD 2020 1121        Estimated Blood Loss:   Minimal    Drains:  Ureteral Drain/Stent Left ureter 6 Fr  (Active)   Number of days: 0       Anesthesia Type:   General    Operative Indications:  Kidney stone [N20 0]  Bladder stones    Operative Findings:  Normal urethra with bilobar prostatic hypertrophy and elevation of the median bar causing complete outlet obstruction  Ureteral orifices were unremarkable  Bladder was mild-to-moderately trabeculated  No tumors were seen  On the floor of the bladder were 2 stones  The largest stone was approximately 1 5 cm in size  The other stone was approximately 1 cm in size  Those stones were subject to laser lithotripsy and evacuation  Left retrograde pyelography revealed a 1 5 cm ureteropelvic junction stone causing moderate hydronephrosis  This was treated ureteroscopically with the holmium laser  Additional stones in the kidney were treated as well  A 6 Persian stent was left at the conclusion the procedure  After a KUB is checked the stent can be removed cystoscopically in approximately 2 weeks  Complications:   None    Procedure and Technique:  The patient has read the upper improbable identified  General anesthesia is administered    He is placed in lithotomy position and prepped and draped in the usual sterile fashion  Compression boots were employed  Intravenous antibiotic was administered  An appropriate time-out was performed  Cystourethroscopy was performed with a 25 [place] cystoscope with findings as above  The stones were identified in the bladder  The holmium laser and 1000 micron fiber were then utilized to laser lithotripsy the stones into small fragments which were then evacuated from the bladder  All stone fragments were basketed from the bladder  Next, an open-ended ureteral catheter and dilute Omnipaque were utilized to perform a left retrograde pyelogram   Preliminary images did reveal both the bladder stones as well as a large left calcification in the area of the ureteropelvic junction  The calcification in the ureteropelvic junction it was confirmed on retrograde study to be in the ureter  This was causing hydronephrosis  Next, a wire was placed up the left collecting system and up past the stone into the kidney  This was done under fluoroscopic guidance  The bladder was drained and the cystoscope removed  A ureteral access sheath (10-12 [place]) was then placed  First the obturator was placed to slowly dilate the ureter  Next the ureteral access sheath and obturator were placed  The obturator was then removed and an additional wire placed through  This left 2 wires up in the collecting system  The ureteral access sheath was then removed and replaced over the initial wire  The obturator and wire were then removed leaving the 2nd wire outside the access sheath to serve as a safety wire  Flexible ureteral scope was then employed and positioned at the level of the ureteropelvic junction stone  The stone appeared to have been in place for a long period of time as the mucosa was starting to grow over it  The 270 micron fiber was then utilized at dusting settings with the holmium laser to laser the stone into tiny fragments  The stone was completely fragmented    Next systematic pyeloscopy was performed  This was difficult as visualization was poor secondary to debris in the renal pelvis  A stone fragment in a mid pole was then treated with the holmium laser  An additional stone in the lower pole was identified but was not able to be adequately accessed for ureteroscopic laser lithotripsy  Additional stones were treated in the midpole calyx but these were likely fragments from the large ureteropelvic junction stone  At the conclusion of the procedure all fragments appeared passable  The lower pole stone did not appear to be at risk for passage at this time  The flexible ureteroscope and ureteral access sheath were then slowly withdrawn in tandem and no stones or damage identified in the ureter  The safety wire was then backloaded through the cystoscope  The bladder was drained  A 6 [place] stent was then placed in standard fashion and pushed into position  The Dangler was left long but cut at the level urethral meatus to aid in the stent cystoscopically removal in approximately 2 weeks  At the conclusion the procedure nice stent coil was seen in the renal pelvis, and a nice coil seen cystoscopically in the bladder  The bladder was drained, and the cystoscope removed  The patient tolerated the procedure well  He was awakened from anesthesia and taken to the recovery room in satisfactory condition    I was present for the entire procedure    Patient Disposition:  PACU     SIGNATURE: Mindy Fuentes MD  DATE: February 6, 2020  TIME: 1:05 PM

## 2020-02-06 NOTE — DISCHARGE INSTRUCTIONS
Ureteroscopy   WHAT YOU NEED TO KNOW:   A ureteroscopy is a procedure to examine in the inside of your urinary tract, which includes your urethra, bladder, ureters, and kidneys  A ureteroscope is a small, thin tube with a light and camera on the end  Ureteroscopy can help your healthcare provider diagnose and treat problems in your urinary tract, such as kidney stones  DISCHARGE INSTRUCTIONS:   Medicine:   · Antibiotics  may be given to treat or prevent an infection  · Take your medicine as directed  Contact your healthcare provider if you think your medicine is not helping or if you have side effects  Tell him or her if you are allergic to any medicine  Keep a list of the medicines, vitamins, and herbs you take  Include the amounts, and when and why you take them  Bring the list or the pill bottles to follow-up visits  Carry your medicine list with you in case of an emergency  Follow up with your healthcare provider as directed:  Write down your questions so you remember to ask them during your visits  Drink liquids as directed  Liquids can help prevent kidney stones and urinary tract infections  Drink water and limit the amount of caffeine you drink  Caffeine may be found in coffee, tea, soda, sports drinks, and foods  Ask your healthcare provider how much liquid to drink each day  Contact your healthcare provider if:   · You have a fever  You cannot urinate  · You are vomiting  · You have pain in your abdomen or side  · You have questions or concerns about your condition or care  © 2017 [address] Information is for End User's use only and may not be sold, redistributed or otherwise used for commercial purposes  All illustrations and images included in CareNotes® are the copyrighted property of A D A M [employer] Inc  or [employer]  The above information is an  only  It is not intended as medical advice for individual conditions or treatments   Talk to your doctor, nurse or pharmacist before following any medical regimen to see if it is safe and effective for you

## 2020-02-06 NOTE — TELEPHONE ENCOUNTER
Called and spoke to pt's wife  Appt scheduled for 2/25/20  Pt will go for KUB a few days prior to appt

## 2020-02-06 NOTE — INTERVAL H&P NOTE
H&P reviewed  After examining the patient I find no changes in the patients condition since the H&P had been written  Vitals:    02/06/20 0852   BP: 162/85   Pulse: 89   Resp: 16   Temp: 97 9 °F (36 6 °C)   SpO2: 96%   Pt seen and examined  CT reviewed with the patient  Procedure reviewed- risks discussed and consent signed  Laterality marked  (L)
177.8

## 2020-02-08 ENCOUNTER — HOSPITAL ENCOUNTER (INPATIENT)
Facility: HOSPITAL | Age: 70
LOS: 3 days | Discharge: HOME | DRG: 871 | End: 2020-02-12
Attending: EMERGENCY MEDICINE | Admitting: INTERNAL MEDICINE
Payer: COMMERCIAL

## 2020-02-08 DIAGNOSIS — A41.9 SEPTIC SHOCK (HCC): Primary | ICD-10-CM

## 2020-02-08 DIAGNOSIS — I47.2 VENTRICULAR TACHYCARDIA (HCC): ICD-10-CM

## 2020-02-08 DIAGNOSIS — I46.9 CARDIAC ARREST (HCC): ICD-10-CM

## 2020-02-08 DIAGNOSIS — N20.0 KIDNEY STONE: ICD-10-CM

## 2020-02-08 DIAGNOSIS — N17.9 ACUTE KIDNEY INJURY (HCC): ICD-10-CM

## 2020-02-08 DIAGNOSIS — I48.0 PAROXYSMAL ATRIAL FIBRILLATION (HCC): ICD-10-CM

## 2020-02-08 DIAGNOSIS — R65.21 SEPTIC SHOCK (HCC): Primary | ICD-10-CM

## 2020-02-08 PROCEDURE — 83605 ASSAY OF LACTIC ACID: CPT | Performed by: EMERGENCY MEDICINE

## 2020-02-08 PROCEDURE — 36415 COLL VENOUS BLD VENIPUNCTURE: CPT | Performed by: EMERGENCY MEDICINE

## 2020-02-08 PROCEDURE — 92950 HEART/LUNG RESUSCITATION CPR: CPT

## 2020-02-08 PROCEDURE — 84145 PROCALCITONIN (PCT): CPT | Performed by: EMERGENCY MEDICINE

## 2020-02-08 PROCEDURE — 5A12012 PERFORMANCE OF CARDIAC OUTPUT, SINGLE, MANUAL: ICD-10-PCS | Performed by: EMERGENCY MEDICINE

## 2020-02-08 PROCEDURE — 85025 COMPLETE CBC W/AUTO DIFF WBC: CPT | Performed by: EMERGENCY MEDICINE

## 2020-02-08 PROCEDURE — 96361 HYDRATE IV INFUSION ADD-ON: CPT

## 2020-02-08 PROCEDURE — 80053 COMPREHEN METABOLIC PANEL: CPT | Performed by: EMERGENCY MEDICINE

## 2020-02-08 PROCEDURE — 99285 EMERGENCY DEPT VISIT HI MDM: CPT

## 2020-02-08 PROCEDURE — 96375 TX/PRO/DX INJ NEW DRUG ADDON: CPT

## 2020-02-08 PROCEDURE — 99291 CRITICAL CARE FIRST HOUR: CPT | Performed by: EMERGENCY MEDICINE

## 2020-02-08 PROCEDURE — 93005 ELECTROCARDIOGRAM TRACING: CPT

## 2020-02-08 PROCEDURE — 87040 BLOOD CULTURE FOR BACTERIA: CPT | Performed by: EMERGENCY MEDICINE

## 2020-02-08 RX ORDER — ONDANSETRON 2 MG/ML
4 INJECTION INTRAMUSCULAR; INTRAVENOUS ONCE
Status: COMPLETED | OUTPATIENT
Start: 2020-02-08 | End: 2020-02-09

## 2020-02-08 RX ORDER — ONDANSETRON 2 MG/ML
1 INJECTION INTRAMUSCULAR; INTRAVENOUS ONCE
Status: COMPLETED | OUTPATIENT
Start: 2020-02-08 | End: 2020-02-08

## 2020-02-08 RX ADMIN — SODIUM CHLORIDE 1000 ML: 0.9 INJECTION, SOLUTION INTRAVENOUS at 23:53

## 2020-02-08 RX ADMIN — ATROPINE SULFATE 1 MG: 1 INJECTION, SOLUTION INTRAMUSCULAR; INTRAVENOUS; SUBCUTANEOUS at 23:46

## 2020-02-08 RX ADMIN — AMIODARONE HYDROCHLORIDE 150 MG: 50 INJECTION, SOLUTION INTRAVENOUS at 23:58

## 2020-02-09 ENCOUNTER — APPOINTMENT (EMERGENCY)
Dept: CT IMAGING | Facility: HOSPITAL | Age: 70
DRG: 871 | End: 2020-02-09
Payer: COMMERCIAL

## 2020-02-09 ENCOUNTER — APPOINTMENT (EMERGENCY)
Dept: RADIOLOGY | Facility: HOSPITAL | Age: 70
DRG: 871 | End: 2020-02-09
Payer: COMMERCIAL

## 2020-02-09 PROBLEM — N17.9 AKI (ACUTE KIDNEY INJURY) (HCC): Status: ACTIVE | Noted: 2020-02-09

## 2020-02-09 PROBLEM — R65.21 SEPTIC SHOCK (HCC): Status: ACTIVE | Noted: 2020-02-09

## 2020-02-09 PROBLEM — N39.0 UTI (URINARY TRACT INFECTION): Status: ACTIVE | Noted: 2020-02-09

## 2020-02-09 PROBLEM — A41.9 SEPTIC SHOCK (HCC): Status: ACTIVE | Noted: 2020-02-09

## 2020-02-09 PROBLEM — I46.9 CARDIAC ARREST WITH SUCCESSFUL RESUSCITATION (HCC): Status: ACTIVE | Noted: 2020-02-09

## 2020-02-09 PROBLEM — I24.9 ACS (ACUTE CORONARY SYNDROME) (HCC): Status: ACTIVE | Noted: 2020-02-09

## 2020-02-09 LAB
ALBUMIN SERPL BCP-MCNC: 2.7 G/DL (ref 3.5–5)
ALP SERPL-CCNC: 64 U/L (ref 46–116)
ALT SERPL W P-5'-P-CCNC: 32 U/L (ref 12–78)
ANION GAP SERPL CALCULATED.3IONS-SCNC: 11 MMOL/L (ref 4–13)
ANION GAP SERPL CALCULATED.3IONS-SCNC: 13 MMOL/L (ref 4–13)
APTT PPP: 28 SECONDS (ref 23–37)
AST SERPL W P-5'-P-CCNC: 58 U/L (ref 5–45)
BACTERIA UR QL AUTO: ABNORMAL /HPF
BASOPHILS # BLD AUTO: 0.02 THOUSANDS/ΜL (ref 0–0.1)
BASOPHILS # BLD AUTO: 0.04 THOUSANDS/ΜL (ref 0–0.1)
BASOPHILS NFR BLD AUTO: 0 % (ref 0–1)
BASOPHILS NFR BLD AUTO: 0 % (ref 0–1)
BILIRUB SERPL-MCNC: 1.48 MG/DL (ref 0.2–1)
BILIRUB UR QL STRIP: ABNORMAL
BUN SERPL-MCNC: 39 MG/DL (ref 5–25)
BUN SERPL-MCNC: 39 MG/DL (ref 5–25)
CALCIUM SERPL-MCNC: 8 MG/DL (ref 8.3–10.1)
CALCIUM SERPL-MCNC: 8.1 MG/DL (ref 8.3–10.1)
CHLORIDE SERPL-SCNC: 101 MMOL/L (ref 100–108)
CHLORIDE SERPL-SCNC: 103 MMOL/L (ref 100–108)
CHOLEST SERPL-MCNC: 114 MG/DL (ref 50–200)
CLARITY UR: ABNORMAL
CO2 SERPL-SCNC: 21 MMOL/L (ref 21–32)
CO2 SERPL-SCNC: 24 MMOL/L (ref 21–32)
COLOR UR: ABNORMAL
CREAT SERPL-MCNC: 2.11 MG/DL (ref 0.6–1.3)
CREAT SERPL-MCNC: 2.22 MG/DL (ref 0.6–1.3)
EOSINOPHIL # BLD AUTO: 0 THOUSAND/ΜL (ref 0–0.61)
EOSINOPHIL # BLD AUTO: 0.01 THOUSAND/ΜL (ref 0–0.61)
EOSINOPHIL NFR BLD AUTO: 0 % (ref 0–6)
EOSINOPHIL NFR BLD AUTO: 0 % (ref 0–6)
ERYTHROCYTE [DISTWIDTH] IN BLOOD BY AUTOMATED COUNT: 13.5 % (ref 11.6–15.1)
ERYTHROCYTE [DISTWIDTH] IN BLOOD BY AUTOMATED COUNT: 13.6 % (ref 11.6–15.1)
FINE GRAN CASTS URNS QL MICRO: ABNORMAL /LPF
GFR SERPL CREATININE-BSD FRML MDRD: 29 ML/MIN/1.73SQ M
GFR SERPL CREATININE-BSD FRML MDRD: 31 ML/MIN/1.73SQ M
GLUCOSE SERPL-MCNC: 177 MG/DL (ref 65–140)
GLUCOSE SERPL-MCNC: 224 MG/DL (ref 65–140)
GLUCOSE SERPL-MCNC: 232 MG/DL (ref 65–140)
GLUCOSE SERPL-MCNC: 239 MG/DL (ref 65–140)
GLUCOSE SERPL-MCNC: 244 MG/DL (ref 65–140)
GLUCOSE SERPL-MCNC: 259 MG/DL (ref 65–140)
GLUCOSE UR STRIP-MCNC: NEGATIVE MG/DL
HCT VFR BLD AUTO: 40.3 % (ref 36.5–49.3)
HCT VFR BLD AUTO: 43.3 % (ref 36.5–49.3)
HDLC SERPL-MCNC: 13 MG/DL
HGB BLD-MCNC: 12.8 G/DL (ref 12–17)
HGB BLD-MCNC: 13.7 G/DL (ref 12–17)
HGB UR QL STRIP.AUTO: ABNORMAL
IMM GRANULOCYTES # BLD AUTO: 0.37 THOUSAND/UL (ref 0–0.2)
IMM GRANULOCYTES # BLD AUTO: 0.44 THOUSAND/UL (ref 0–0.2)
IMM GRANULOCYTES NFR BLD AUTO: 2 % (ref 0–2)
IMM GRANULOCYTES NFR BLD AUTO: 2 % (ref 0–2)
INR PPP: 1.37 (ref 0.84–1.19)
KETONES UR STRIP-MCNC: ABNORMAL MG/DL
LACTATE SERPL-SCNC: 2.2 MMOL/L (ref 0.5–2)
LACTATE SERPL-SCNC: 3 MMOL/L (ref 0.5–2)
LDLC SERPL CALC-MCNC: 65 MG/DL (ref 0–100)
LEUKOCYTE ESTERASE UR QL STRIP: ABNORMAL
LYMPHOCYTES # BLD AUTO: 0.62 THOUSANDS/ΜL (ref 0.6–4.47)
LYMPHOCYTES # BLD AUTO: 0.78 THOUSANDS/ΜL (ref 0.6–4.47)
LYMPHOCYTES NFR BLD AUTO: 3 % (ref 14–44)
LYMPHOCYTES NFR BLD AUTO: 4 % (ref 14–44)
MAGNESIUM SERPL-MCNC: 1.9 MG/DL (ref 1.6–2.6)
MCH RBC QN AUTO: 30 PG (ref 26.8–34.3)
MCH RBC QN AUTO: 30 PG (ref 26.8–34.3)
MCHC RBC AUTO-ENTMCNC: 31.6 G/DL (ref 31.4–37.4)
MCHC RBC AUTO-ENTMCNC: 31.8 G/DL (ref 31.4–37.4)
MCV RBC AUTO: 95 FL (ref 82–98)
MCV RBC AUTO: 95 FL (ref 82–98)
MONOCYTES # BLD AUTO: 1.79 THOUSAND/ΜL (ref 0.17–1.22)
MONOCYTES # BLD AUTO: 2.27 THOUSAND/ΜL (ref 0.17–1.22)
MONOCYTES NFR BLD AUTO: 11 % (ref 4–12)
MONOCYTES NFR BLD AUTO: 9 % (ref 4–12)
NEUTROPHILS # BLD AUTO: 16.47 THOUSANDS/ΜL (ref 1.85–7.62)
NEUTROPHILS # BLD AUTO: 16.54 THOUSANDS/ΜL (ref 1.85–7.62)
NEUTS SEG NFR BLD AUTO: 83 % (ref 43–75)
NEUTS SEG NFR BLD AUTO: 86 % (ref 43–75)
NITRITE UR QL STRIP: POSITIVE
NON-SQ EPI CELLS URNS QL MICRO: ABNORMAL /HPF
NONHDLC SERPL-MCNC: 101 MG/DL
NRBC BLD AUTO-RTO: 0 /100 WBCS
NRBC BLD AUTO-RTO: 0 /100 WBCS
PH UR STRIP.AUTO: 5.5 [PH] (ref 4.5–8)
PHOSPHATE SERPL-MCNC: 3.8 MG/DL (ref 2.3–4.1)
PLATELET # BLD AUTO: 194 THOUSANDS/UL (ref 149–390)
PLATELET # BLD AUTO: 205 THOUSANDS/UL (ref 149–390)
PMV BLD AUTO: 10.3 FL (ref 8.9–12.7)
PMV BLD AUTO: 9.8 FL (ref 8.9–12.7)
POTASSIUM SERPL-SCNC: 4 MMOL/L (ref 3.5–5.3)
POTASSIUM SERPL-SCNC: 5.2 MMOL/L (ref 3.5–5.3)
PROCALCITONIN SERPL-MCNC: 20.64 NG/ML
PROCALCITONIN SERPL-MCNC: 21.34 NG/ML
PROCALCITONIN SERPL-MCNC: 26.2 NG/ML
PROT SERPL-MCNC: 6.6 G/DL (ref 6.4–8.2)
PROT UR STRIP-MCNC: >=300 MG/DL
PROTHROMBIN TIME: 17.1 SECONDS (ref 11.6–14.5)
QRS AXIS: 35 DEGREES
QRS AXIS: 90 DEGREES
QRSD INTERVAL: 102 MS
QRSD INTERVAL: 108 MS
QT INTERVAL: 280 MS
QT INTERVAL: 288 MS
QTC INTERVAL: 448 MS
QTC INTERVAL: 475 MS
RBC # BLD AUTO: 4.26 MILLION/UL (ref 3.88–5.62)
RBC # BLD AUTO: 4.57 MILLION/UL (ref 3.88–5.62)
RBC #/AREA URNS AUTO: ABNORMAL /HPF
SODIUM SERPL-SCNC: 135 MMOL/L (ref 136–145)
SODIUM SERPL-SCNC: 138 MMOL/L (ref 136–145)
SP GR UR STRIP.AUTO: 1.02 (ref 1–1.03)
T WAVE AXIS: -43 DEGREES
T WAVE AXIS: -62 DEGREES
TRIGL SERPL-MCNC: 178 MG/DL
TROPONIN I SERPL-MCNC: 0.06 NG/ML
TROPONIN I SERPL-MCNC: 0.17 NG/ML
TROPONIN I SERPL-MCNC: 0.34 NG/ML
TROPONIN I SERPL-MCNC: 0.53 NG/ML
UROBILINOGEN UR QL STRIP.AUTO: 0.2 E.U./DL
VENTRICULAR RATE: 154 BPM
VENTRICULAR RATE: 164 BPM
WBC # BLD AUTO: 19.28 THOUSAND/UL (ref 4.31–10.16)
WBC # BLD AUTO: 20.07 THOUSAND/UL (ref 4.31–10.16)
WBC #/AREA URNS AUTO: ABNORMAL /HPF

## 2020-02-09 PROCEDURE — 71275 CT ANGIOGRAPHY CHEST: CPT

## 2020-02-09 PROCEDURE — 80061 LIPID PANEL: CPT | Performed by: NURSE PRACTITIONER

## 2020-02-09 PROCEDURE — 96361 HYDRATE IV INFUSION ADD-ON: CPT

## 2020-02-09 PROCEDURE — 81001 URINALYSIS AUTO W/SCOPE: CPT

## 2020-02-09 PROCEDURE — 82948 REAGENT STRIP/BLOOD GLUCOSE: CPT

## 2020-02-09 PROCEDURE — 85610 PROTHROMBIN TIME: CPT | Performed by: EMERGENCY MEDICINE

## 2020-02-09 PROCEDURE — 83735 ASSAY OF MAGNESIUM: CPT | Performed by: NURSE PRACTITIONER

## 2020-02-09 PROCEDURE — 96365 THER/PROPH/DIAG IV INF INIT: CPT

## 2020-02-09 PROCEDURE — 87086 URINE CULTURE/COLONY COUNT: CPT

## 2020-02-09 PROCEDURE — 74177 CT ABD & PELVIS W/CONTRAST: CPT

## 2020-02-09 PROCEDURE — 99291 CRITICAL CARE FIRST HOUR: CPT | Performed by: NURSE PRACTITIONER

## 2020-02-09 PROCEDURE — 83605 ASSAY OF LACTIC ACID: CPT | Performed by: EMERGENCY MEDICINE

## 2020-02-09 PROCEDURE — 93005 ELECTROCARDIOGRAM TRACING: CPT

## 2020-02-09 PROCEDURE — 85025 COMPLETE CBC W/AUTO DIFF WBC: CPT | Performed by: NURSE PRACTITIONER

## 2020-02-09 PROCEDURE — 84100 ASSAY OF PHOSPHORUS: CPT | Performed by: NURSE PRACTITIONER

## 2020-02-09 PROCEDURE — 85730 THROMBOPLASTIN TIME PARTIAL: CPT | Performed by: EMERGENCY MEDICINE

## 2020-02-09 PROCEDURE — 36415 COLL VENOUS BLD VENIPUNCTURE: CPT | Performed by: EMERGENCY MEDICINE

## 2020-02-09 PROCEDURE — 71045 X-RAY EXAM CHEST 1 VIEW: CPT

## 2020-02-09 PROCEDURE — 84145 PROCALCITONIN (PCT): CPT | Performed by: NURSE PRACTITIONER

## 2020-02-09 PROCEDURE — 99255 IP/OBS CONSLTJ NEW/EST HI 80: CPT | Performed by: UROLOGY

## 2020-02-09 PROCEDURE — 84484 ASSAY OF TROPONIN QUANT: CPT | Performed by: NURSE PRACTITIONER

## 2020-02-09 PROCEDURE — 93010 ELECTROCARDIOGRAM REPORT: CPT | Performed by: INTERNAL MEDICINE

## 2020-02-09 PROCEDURE — 99254 IP/OBS CNSLTJ NEW/EST MOD 60: CPT | Performed by: INTERNAL MEDICINE

## 2020-02-09 PROCEDURE — 84484 ASSAY OF TROPONIN QUANT: CPT | Performed by: EMERGENCY MEDICINE

## 2020-02-09 PROCEDURE — 80048 BASIC METABOLIC PNL TOTAL CA: CPT | Performed by: NURSE PRACTITIONER

## 2020-02-09 RX ORDER — ACETAMINOPHEN 325 MG/1
975 TABLET ORAL EVERY 8 HOURS PRN
Status: DISCONTINUED | OUTPATIENT
Start: 2020-02-09 | End: 2020-02-12 | Stop reason: HOSPADM

## 2020-02-09 RX ORDER — NOREPINEPHRINE BITARTRATE 1 MG/ML
INJECTION, SOLUTION INTRAVENOUS
Status: COMPLETED
Start: 2020-02-09 | End: 2020-02-09

## 2020-02-09 RX ORDER — LANOLIN ALCOHOL/MO/W.PET/CERES
6 CREAM (GRAM) TOPICAL
Status: DISCONTINUED | OUTPATIENT
Start: 2020-02-09 | End: 2020-02-12 | Stop reason: HOSPADM

## 2020-02-09 RX ORDER — HEPARIN SODIUM 5000 [USP'U]/ML
5000 INJECTION, SOLUTION INTRAVENOUS; SUBCUTANEOUS EVERY 8 HOURS SCHEDULED
Status: DISCONTINUED | OUTPATIENT
Start: 2020-02-09 | End: 2020-02-12 | Stop reason: HOSPADM

## 2020-02-09 RX ORDER — SODIUM CHLORIDE, SODIUM LACTATE, POTASSIUM CHLORIDE, CALCIUM CHLORIDE 600; 310; 30; 20 MG/100ML; MG/100ML; MG/100ML; MG/100ML
125 INJECTION, SOLUTION INTRAVENOUS CONTINUOUS
Status: DISCONTINUED | OUTPATIENT
Start: 2020-02-09 | End: 2020-02-11

## 2020-02-09 RX ORDER — ATROPINE SULFATE 1 MG/ML
1 INJECTION, SOLUTION INTRAMUSCULAR; INTRAVENOUS; SUBCUTANEOUS ONCE
Status: COMPLETED | OUTPATIENT
Start: 2020-02-09 | End: 2020-02-08

## 2020-02-09 RX ADMIN — SODIUM CHLORIDE 1000 ML: 0.9 INJECTION, SOLUTION INTRAVENOUS at 00:26

## 2020-02-09 RX ADMIN — ACETAMINOPHEN 975 MG: 325 TABLET ORAL at 23:58

## 2020-02-09 RX ADMIN — NOREPINEPHRINE BITARTRATE 5 MCG/MIN: 1 INJECTION INTRAVENOUS at 00:32

## 2020-02-09 RX ADMIN — INSULIN LISPRO 2 UNITS: 100 INJECTION, SOLUTION INTRAVENOUS; SUBCUTANEOUS at 06:03

## 2020-02-09 RX ADMIN — SODIUM CHLORIDE 1000 ML: 0.9 INJECTION, SOLUTION INTRAVENOUS at 00:24

## 2020-02-09 RX ADMIN — INSULIN LISPRO 1 UNITS: 100 INJECTION, SOLUTION INTRAVENOUS; SUBCUTANEOUS at 12:14

## 2020-02-09 RX ADMIN — ACETAMINOPHEN 975 MG: 325 TABLET ORAL at 13:12

## 2020-02-09 RX ADMIN — HEPARIN SODIUM 5000 UNITS: 5000 INJECTION INTRAVENOUS; SUBCUTANEOUS at 06:03

## 2020-02-09 RX ADMIN — NOREPINEPHRINE BITARTRATE: 1 INJECTION INTRAVENOUS at 00:33

## 2020-02-09 RX ADMIN — HEPARIN SODIUM 5000 UNITS: 5000 INJECTION INTRAVENOUS; SUBCUTANEOUS at 13:50

## 2020-02-09 RX ADMIN — NOREPINEPHRINE BITARTRATE 7 MCG/MIN: 1 INJECTION INTRAVENOUS at 03:33

## 2020-02-09 RX ADMIN — NOREPINEPHRINE BITARTRATE 6 MCG/MIN: 1 INJECTION INTRAVENOUS at 03:39

## 2020-02-09 RX ADMIN — SODIUM CHLORIDE 1000 ML: 0.9 INJECTION, SOLUTION INTRAVENOUS at 01:17

## 2020-02-09 RX ADMIN — SODIUM CHLORIDE, SODIUM LACTATE, POTASSIUM CHLORIDE, AND CALCIUM CHLORIDE 125 ML/HR: .6; .31; .03; .02 INJECTION, SOLUTION INTRAVENOUS at 11:49

## 2020-02-09 RX ADMIN — INSULIN LISPRO 2 UNITS: 100 INJECTION, SOLUTION INTRAVENOUS; SUBCUTANEOUS at 17:59

## 2020-02-09 RX ADMIN — CEFEPIME HYDROCHLORIDE 2000 MG: 2 INJECTION, POWDER, FOR SOLUTION INTRAVENOUS at 00:34

## 2020-02-09 RX ADMIN — AMIODARONE HYDROCHLORIDE 0.5 MG/MIN: 50 INJECTION, SOLUTION INTRAVENOUS at 08:13

## 2020-02-09 RX ADMIN — INSULIN LISPRO 2 UNITS: 100 INJECTION, SOLUTION INTRAVENOUS; SUBCUTANEOUS at 23:06

## 2020-02-09 RX ADMIN — IOHEXOL 100 ML: 350 INJECTION, SOLUTION INTRAVENOUS at 01:43

## 2020-02-09 RX ADMIN — ONDANSETRON 4 MG: 2 INJECTION INTRAMUSCULAR; INTRAVENOUS at 20:26

## 2020-02-09 RX ADMIN — SODIUM CHLORIDE, SODIUM LACTATE, POTASSIUM CHLORIDE, AND CALCIUM CHLORIDE 125 ML/HR: .6; .31; .03; .02 INJECTION, SOLUTION INTRAVENOUS at 03:40

## 2020-02-09 RX ADMIN — HEPARIN SODIUM 5000 UNITS: 5000 INJECTION INTRAVENOUS; SUBCUTANEOUS at 22:37

## 2020-02-09 RX ADMIN — CEFTRIAXONE 2000 MG: 2 INJECTION, POWDER, FOR SOLUTION INTRAMUSCULAR; INTRAVENOUS at 11:50

## 2020-02-09 NOTE — ASSESSMENT & PLAN NOTE
Creatinine on 1/28/20 was 1 33, is now 2 22  Will monitor renal indices and urine output  Avoid nephrotoxins

## 2020-02-09 NOTE — ASSESSMENT & PLAN NOTE
S/P stent placement on 2/6/20  CT scan revealed air and stranding in left renal pelvis which could be related to recent stent, stent appears to have migrated  Will consult urology

## 2020-02-09 NOTE — ASSESSMENT & PLAN NOTE
Secondary to UTI from cysto with stent placement on 2/6  Admitted with complaints of N/V, anorexia, syncope  Sepsis protocol initiated in ED  Received cefepime, 4L IVF, placed on levophed infusion for persistent hypotension  Blood cultures, urine culture sent  Follow cultures, wbc, temps, procalcitonin

## 2020-02-09 NOTE — H&P
H&P- Seb List [birth year], 71 y o  male MRN: [id number]    Unit/Bed#: ICU 14 Encounter: [id number]    Primary Care Provider: Neha Cole MD   Date and time admitted to hospital: 2/8/2020 11:14 PM        Cardiac arrest with successful resuscitation [facility])  Assessment & Plan  Patient developed Terris Ewings; on initial assessment he was awake and alert, however he became unresponsive during attempt at intervention and CPR, ACLS protocol immediately started   He had one round of CPR with defibrillation and achieved ROSC  He suddenly woke up following the code and is awake, alert, interactive  He was given one bolus of amiodarone, infusion was never started bc he had several episodes of bradycardia  Currently in NSR  Will trend troponins, EKG  Obtain echo  Consult cardiology  Monitor closely    * Septic shock [facility])  Assessment & Plan  Secondary to UTI from cysto with stent placement on 2/6  Admitted with complaints of N/V, anorexia, syncope  Sepsis protocol initiated in ED  Received cefepime, 4L IVF, placed on levophed infusion for persistent hypotension  Blood cultures, urine culture sent  Follow cultures, wbc, temps, procalcitonin        DAVINA (acute kidney injury) ([address] )  Assessment & Plan  Creatinine on 1/28/20 was 1 33, is now 2 22  Will monitor renal indices and urine output  Avoid nephrotoxins    UTI (urinary tract infection)  Assessment & Plan  +U/A for leukocytes, nitrites, ketones, blood  Initiate ceftriaxone      Kidney stone  Assessment & Plan  S/P stent placement on 2/6/20  CT scan revealed air and stranding in left renal pelvis which could be related to recent stent, stent appears to have migrated  Will consult urology    BPH without obstruction/lower urinary tract symptoms  Assessment & Plan  Urinary retention protocol  Takes flomax as outpatient, will hold until BP stable    -------------------------------------------------------------------------------------------------------------  Chief Complaint: chills, nausea, vomiting, urinary frequency, syncope    History of Present Illness   HX and PE limited by: n/a  Matthew Winn is a 71 y o  male with a PMH of DM2, Hypertension, hyperlipidemia and kidney stones who presents with chills, nausea, vomiting, urinary frequency for past 2 days  He experienced a syncopal episode at home which prompted activation of EMS  Patient had a left ureteral stent placed on 2/6 for a kidney stone and generally not been feeling well since the procedure  Shortly after arrival to ED patient developed rapid afib in the 180s with hypotension, followed by Irving Villatoro  He was initially responsive during the vtach but became unresponsive as witnessed by staff and immediately resuscitated  ROSC was achieved after one round of CPR with defibrillation  He was given an amiodarone bolus at that time  He then developed bradycardia and apnea, upon preparation for intubation, patient woke up with full neurologic response  BP was persistently low despite fluid resuscitation requiring addition of levophed infusion  ED work up revealed a WBC of 20, lactic acidosis of 3, +u/a with an acute kidney injury  CAP CT reveals no acute process, no PE  Patient denied chest pain in ED and before arrival to hospital    History obtained from chart review and the patient   -------------------------------------------------------------------------------------------------------------  Dispo: Admit to Critical Care     Code Status: Level 1 - Full Code  --------------------------------------------------------------------------------------------------------------  Review of Systems   Constitutional: Positive for chills and fatigue  HENT: Negative  Eyes: Negative  Respiratory: Negative  Cardiovascular: Negative  Gastrointestinal: Positive for nausea and vomiting  Endocrine: Negative  Genitourinary: Positive for frequency  Musculoskeletal: Negative  Skin: Negative  Allergic/Immunologic: Negative  Neurological: Positive for weakness  Hematological: Negative  Psychiatric/Behavioral: Negative  A 12-point, complete review of systems was reviewed and negative except as stated above     Physical Exam   Constitutional: He is oriented to person, place, and time  He appears well-developed and well-nourished  Obese, lethargic, cooperative   HENT:   Head: Normocephalic and atraumatic  Right Ear: External ear normal    Left Ear: External ear normal    Nose: Nose normal    Mouth/Throat: Oropharynx is clear and moist    Eyes: Pupils are equal, round, and reactive to light  Conjunctivae and EOM are normal    Neck: Normal range of motion  Neck supple  No JVD present  No tracheal deviation present  No thyromegaly present  Cardiovascular: Normal rate, regular rhythm, normal heart sounds and intact distal pulses  Pulmonary/Chest: Effort normal and breath sounds normal    Abdominal: Soft  Bowel sounds are normal    Musculoskeletal: Normal range of motion  He exhibits no edema  Lymphadenopathy:     He has no cervical adenopathy  Neurological: He is alert and oriented to person, place, and time  Skin: Skin is warm and dry  Capillary refill takes less than 2 seconds  Psychiatric: He has a normal mood and affect  His behavior is normal  Judgment and thought content normal    Nursing note and vitals reviewed  --------------------------------------------------------------------------------------------------------------  Vitals:   Vitals:    02/09/20 0253 02/09/20 0315 02/09/20 0330 02/09/20 0345   BP: 107/79 122/75 110/77    BP Location: Right arm      Pulse: 78 78 78 80   Resp: 18 (!) 29 (!) 24 (!) 25   Temp:  99 °F (37 2 °C)     TempSrc:  Temporal     SpO2: 98% 96% 94% 93%   Weight:   107 kg (235 lb 7 2 oz)      Temp  Min: 97 7 °F (36 5 °C)  Max: 99 °F (37 2 °C)  IBW: -88 kg     Body mass index is 31 06 kg/m²      Laboratory and Diagnostics:  Results from last 7 days   Lab Units 02/08/20  2354   WBC Thousand/uL 20 07*   HEMOGLOBIN g/dL 13 7   HEMATOCRIT % 43 3   PLATELETS Thousands/uL 194   NEUTROS PCT % 83*   MONOS PCT % 11     Results from last 7 days   Lab Units 02/08/20  2354   SODIUM mmol/L 135*   POTASSIUM mmol/L 5 2   CHLORIDE mmol/L 101   CO2 mmol/L 21   ANION GAP mmol/L 13   BUN mg/dL 39*   CREATININE mg/dL 2 22*   CALCIUM mg/dL 8 1*   GLUCOSE RANDOM mg/dL 232*   ALT U/L 32   AST U/L 58*   ALK PHOS U/L 64   ALBUMIN g/dL 2 7*   TOTAL BILIRUBIN mg/dL 1 48*          Results from last 7 days   Lab Units 02/09/20  0023   INR  1 37*   PTT seconds 28      Results from last 7 days   Lab Units 02/09/20  0031   TROPONIN I ng/mL 0 06*     Results from last 7 days   Lab Units 02/09/20  0203 02/08/20  2354   LACTIC ACID mmol/L 2 2* 3 0*     ABG:    VBG:          Micro:        EKG: NSR  Imaging: I have personally reviewed pertinent reports  Historical Information   Past Medical History:   Diagnosis Date    Arthritis     Diabetes ([address] )     High blood pressure     Hyperlipidemia     Kidney stones     Neck pain     Wears glasses      Past Surgical History:   Procedure Laterality Date    COLONOSCOPY      HERNIA REPAIR      JOINT REPLACEMENT Right     IA CYSTO/URETERO W/LITHOTRIPSY &INDWELL STENT INSRT Left 2/6/2020    Procedure: CYSTO URETEROSCOPY W/ LITHO HOLMIUM LASER, RETROGRADE PYELOGRAM AND STENT URETERAL; LASER BLADDER STONES; Cystolithopaxy;  Surgeon: Marixa Ennis MD;  Location[facility];  Service: Urology    REPLACEMENT TOTAL KNEE Right     TONSILLECTOMY       Social History   Social History     Substance and Sexual Activity   Alcohol Use Never    Frequency: Never     Social History     Substance and Sexual Activity   Drug Use Never     Social History     Tobacco Use   Smoking Status Former Smoker    Years: 20 00    Last attempt to quit: 2/4/2010    Years since quitting: 10 0   Smokeless Tobacco Never Used     Exercise History: n/a  Family History:   History reviewed   No pertinent family history  I have reviewed this patient's family history and commented on sigificant items within the HPI      Medications:  Current Facility-Administered Medications   Medication Dose Route Frequency    cefTRIAXone (ROCEPHIN) 2,000 mg in dextrose 5 % 50 mL IVPB  2,000 mg Intravenous Q24H    heparin (porcine) subcutaneous injection 5,000 Units  5,000 Units Subcutaneous Q8H [address]    insulin lispro (HumaLOG) 100 units/mL subcutaneous injection 1-5 Units  1-5 Units Subcutaneous Q6H VAHID    lactated ringers infusion  125 mL/hr Intravenous Continuous    norepinephrine (LEVOPHED) 4 mg (STANDARD CONCENTRATION) IV in sodium chloride 0 9% 250 mL  1-30 mcg/min Intravenous Titrated    ondansetron (ZOFRAN) injection 4 mg  4 mg Intravenous Once     Home medications:  Prior to Admission Medications   Prescriptions Last Dose Informant Patient Reported? Taking?    Glucosamine HCl (GLUCOSAMINE PO) 2/8/2020 at Unknown time Self Yes Yes   Sig: Take 1 tablet by mouth daily   Multiple Vitamin (MULTIVITAMIN) tablet 2/8/2020 at Unknown time Self Yes Yes   Sig: Take 1 tablet by mouth daily   Saw Palmetto, Serenoa repens, (SAW PALMETTO BERRY PO) 2/8/2020 at Unknown time Self Yes Yes   Sig: Take 1 tablet by mouth daily   amLODIPine-benazepril (LOTREL 5-10) 5-10 MG per capsule 2/8/2020 at Unknown time Self Yes Yes   Sig: Take 1 capsule by mouth daily   aspirin (ECOTRIN LOW STRENGTH) 81 mg EC tablet 2/8/2020 at Unknown time Self Yes Yes   Sig: Take 81 mg by mouth daily   metFORMIN (GLUCOPHAGE-XR) 500 mg 24 hr tablet 2/8/2020 at Unknown time Self Yes Yes   Sig: Take 1,000 mg by mouth 2 (two) times a day   oxyCODONE (ROXICODONE) 5 mg immediate release tablet 2/8/2020 at Unknown time  No Yes   Sig: Take 1-2 tablets every 6 hours prn   phenazopyridine (PYRIDIUM) 200 mg tablet Not Taking at Unknown time  No No   Sig: Take 1 tablet (200 mg total) by mouth 3 (three) times a day as needed for bladder spasms (Dysuria)   Patient not taking: Reported on 2/9/2020   pravastatin (PRAVACHOL) 40 mg tablet 2/8/2020 at Unknown time Self Yes Yes   Sig: Take 40 mg by mouth daily   tamsulosin (FLOMAX) 0 4 mg 2/8/2020 at Unknown time  No Yes   Sig: TAKE ONE CAPSULE BY MOUTH IN THE EVENING      Facility-Administered Medications: None     Allergies: Allergies   Allergen Reactions    Penicillins Hives       ------------------------------------------------------------------------------------------------------------  Advance Directive and Living Will:      Power of :    POLST:    ------------------------------------------------------------------------------------------------------------  Anticipated Length of Stay is > 2 midnights    Counseling / Coordination of Care  Total Critical Care time spent 47 minutes excluding procedures, teaching and family updates  DMITRY Lara        Portions of the record may have been created with voice recognition software  Occasional wrong word or "sound a like" substitutions may have occurred due to the inherent limitations of voice recognition software    Read the chart carefully and recognize, using context, where substitutions have occurred

## 2020-02-09 NOTE — ASSESSMENT & PLAN NOTE
Patient developed Vtach; on initial assessment he was awake and alert, however he became unresponsive during attempt at intervention and CPR, ACLS protocol immediately started   He had one round of CPR with defibrillation and achieved ROSC  He suddenly woke up following the code and is awake, alert, interactive  He was given one bolus of amiodarone, infusion was never started bc he had several episodes of bradycardia  Currently in NSR  Will trend troponins, EKG  Obtain echo  Consult cardiology  Monitor closely

## 2020-02-09 NOTE — ED PROVIDER NOTES
History  Chief Complaint   Patient presents with    Vomiting     Pt reports vomiting started this afternoon  Pt reports he has been having the chills  Pt denies abdominal pain and urinary symptoms  Pt has cysto on 2/6/20  Pt hypotensive on arrival       A 54-year-old male with past history of diabetes, high blood pressure, hyperlipidemia and kidney stones; BIBA after a syncopal episode at home  Patient states he has felt generally unwell with a decreased appetite for the past 2 days  Patient states today he had several episodes of nonbloody nonbilious emesis, and currently continues to be nauseous  Patient states just prior to arrival he stood up in his bedroom, developed dizziness and then passed out  Event was witnessed by the patient's family who reports he was unconscious for less than a minute before regaining consciousness  Upon regaining consciousness the patient continued to have nausea however no other symptoms  Patient denies associated fever, chills, chest pain, shortness of breath, abdominal pain, diarrhea, peripheral edema and rashes  Pt also reports undergoing a cystoscopy with ureteral stent placement on 2/6 for kidney stones  Pt reports procedure went uncomplicated  Pt has had intermittent dysuria since then  A/P: Syncope, associated with nausea for the past two days  Pt hypotensive on arrival, however remainder of vitals within normal limits  Concern for sepsis secondary to UTI given recent procedure vs dehydration from vomiting  Will obtain labs, UA and CXR for further evaluation  Will start IVF now  History provided by:  Patient, medical records and EMS personnel      Prior to Admission Medications   Prescriptions Last Dose Informant Patient Reported? Taking?    Glucosamine HCl (GLUCOSAMINE PO) 2/8/2020 at Unknown time Self Yes Yes   Sig: Take 1 tablet by mouth daily   Multiple Vitamin (MULTIVITAMIN) tablet 2/8/2020 at Unknown time Self Yes Yes   Sig: Take 1 tablet by mouth daily   Saw Palmetto, Serenoa repens, (SAW PALMETTO BERRY PO) 2/8/2020 at Unknown time Self Yes Yes   Sig: Take 1 tablet by mouth daily   amLODIPine-benazepril (LOTREL 5-10) 5-10 MG per capsule 2/8/2020 at Unknown time Self Yes Yes   Sig: Take 1 capsule by mouth daily   aspirin (ECOTRIN LOW STRENGTH) 81 mg EC tablet 2/8/2020 at Unknown time Self Yes Yes   Sig: Take 81 mg by mouth daily   metFORMIN (GLUCOPHAGE-XR) 500 mg 24 hr tablet 2/8/2020 at Unknown time Self Yes Yes   Sig: Take 1,000 mg by mouth 2 (two) times a day   oxyCODONE (ROXICODONE) 5 mg immediate release tablet 2/8/2020 at Unknown time  No Yes   Sig: Take 1-2 tablets every 6 hours prn   phenazopyridine (PYRIDIUM) 200 mg tablet Not Taking at Unknown time  No No   Sig: Take 1 tablet (200 mg total) by mouth 3 (three) times a day as needed for bladder spasms (Dysuria)   Patient not taking: Reported on 2/9/2020   pravastatin (PRAVACHOL) 40 mg tablet 2/8/2020 at Unknown time Self Yes Yes   Sig: Take 40 mg by mouth daily   tamsulosin (FLOMAX) 0 4 mg 2/8/2020 at Unknown time  No Yes   Sig: TAKE ONE CAPSULE BY MOUTH IN THE EVENING      Facility-Administered Medications: None       Past Medical History:   Diagnosis Date    Arthritis     Diabetes ([address] )     High blood pressure     Hyperlipidemia     Kidney stones     Neck pain     Wears glasses        Past Surgical History:   Procedure Laterality Date    COLONOSCOPY      FL RETROGRADE PYELOGRAM  2/6/2020    HERNIA REPAIR      JOINT REPLACEMENT Right     MI CYSTO/URETERO W/LITHOTRIPSY &INDWELL STENT INSRT Left 2/6/2020    Procedure: CYSTO URETEROSCOPY W/ LITHO HOLMIUM LASER, RETROGRADE PYELOGRAM AND STENT URETERAL; LASER BLADDER STONES; Cystolithopaxy;  Surgeon: Cezar Marie MD;  Location: [facility];  Service: Urology    REPLACEMENT TOTAL KNEE Right     TONSILLECTOMY         History reviewed  No pertinent family history  I have reviewed and agree with the history as documented      Social History Tobacco Use    Smoking status: Former Smoker     Years: 20 00     Last attempt to quit: 2/4/2010     Years since quitting: 10 0    Smokeless tobacco: Never Used   Substance Use Topics    Alcohol use: Never     Frequency: Never    Drug use: Never        Review of Systems   Gastrointestinal: Positive for nausea and vomiting  Neurological: Positive for syncope  All other systems reviewed and are negative        Physical Exam  Physical Exam  General Appearance: alert and oriented, appears uncomfortable  Skin:  Pale and diaphoretic  HEENT: atraumatic, normocephalic  Neck: Supple, trachea midline  Cardiac: RRR; no murmurs, rub, gallops  Pulmonary: lungs CTAB; no wheezes, rales, rhonchi  Gastrointestinal: abdomen soft, nontender, nondistended; no guarding or rebound tenderness; good bowel sounds, no mass or bruits  Extremities:  no pedal edema, 2+ pulses; no calf tenderness, no clubbing, no cyanosis  Neuro:  no focal motor or sensory deficits, CN 2-12 grossly intact  Psych:  Normal mood and affect, normal judgement and insight      Vital Signs  ED Triage Vitals [02/08/20 2318]   Temperature Pulse Respirations Blood Pressure SpO2   97 7 °F (36 5 °C) 75 18 (!) 87/63 97 %      Temp Source Heart Rate Source Patient Position - Orthostatic VS BP Location FiO2 (%)   Oral Monitor Lying Right arm --      Pain Score       No Pain           Vitals:    02/09/20 1400 02/09/20 1500 02/09/20 1600 02/09/20 1700   BP: 106/72 115/75 120/90 116/81   Pulse: 84 82 80 78   Patient Position - Orthostatic VS:             Visual Acuity      ED Medications  Medications   ondansetron (ZOFRAN) injection 4 mg (0 mg Intravenous Hold 2/9/20 0008)   norepinephrine (LEVOPHED) 4 mg (STANDARD CONCENTRATION) IV in sodium chloride 0 9% 250 mL (0 mcg/min Intravenous Stopped 2/9/20 1548)   heparin (porcine) subcutaneous injection 5,000 Units (5,000 Units Subcutaneous Given 2/9/20 1350)   insulin lispro (HumaLOG) 100 units/mL subcutaneous injection 1-5 Units (1 Units Subcutaneous Given 2/9/20 1214)   cefTRIAXone (ROCEPHIN) 2,000 mg in dextrose 5 % 50 mL IVPB (2,000 mg Intravenous New Bag 2/9/20 1150)   lactated ringers infusion (125 mL/hr Intravenous New Bag 2/9/20 1149)   amiodarone (CORDARONE) 900 mg in dextrose 5 % 500 mL infusion (0 5 mg/min Intravenous New Bag 2/9/20 0813)   acetaminophen (TYLENOL) tablet 975 mg (975 mg Oral Given 2/9/20 1312)   ondansetron (FOR EMS ONLY) (ZOFRAN) 4 mg/2 mL injection 4 mg (0 mg Does not apply Given to EMS 2/8/20 2320)   sodium chloride 0 9 % bolus 1,000 mL (0 mL Intravenous Stopped 2/9/20 0022)   amiodarone 150 mg in dextrose 5 % 100 mL IV bolus (0 mg Intravenous Stopped 2/9/20 0010)   cefepime (MAXIPIME) 2 g/50 mL dextrose IVPB (0 mg Intravenous Stopped 2/9/20 0057)   sodium chloride 0 9 % bolus 1,000 mL (0 mL Intravenous Stopped 2/9/20 0043)   sodium chloride 0 9 % bolus 1,000 mL (0 mL Intravenous Stopped 2/9/20 0057)   norepinephrine (LEVOPHED) 1 mg/mL injection **ADS Override Pull** (  Given 2/9/20 0033)   sodium chloride 0 9 % bolus 1,000 mL (1,000 mL Intravenous New Bag 2/9/20 0117)   iohexol (OMNIPAQUE) 350 MG/ML injection (MULTI-DOSE) 100 mL (100 mL Intravenous Given 2/9/20 0143)   atropine injection 1 mg (1 mg Intravenous Given 2/8/20 2346)       Diagnostic Studies  Results Reviewed     Procedure Component Value Units Date/Time    Blood culture #1 [[id number]] Collected:  02/08/20 2355    Lab Status:  Preliminary result Specimen:  Blood from Hand, Left Updated:  02/09/20 0801     Blood Culture Received in Microbiology Lab  Culture in Progress  Blood culture #2 [[id number]] Collected:  02/08/20 2354    Lab Status:  Preliminary result Specimen:  Blood from Arm, Right Updated:  02/09/20 0801     Blood Culture Received in Microbiology Lab  Culture in Progress      Procalcitonin [[id number]]  (Abnormal) Collected:  02/08/20 1183    Lab Status:  Final result Specimen:  Blood from Arm, Right Updated:  02/09/20 8884 Procalcitonin 26 20 ng/ml     Urine Microscopic [[id number]]  (Abnormal) Collected:  02/09/20 0242    Lab Status:  Final result Specimen:  Urine, Clean Catch Updated:  02/09/20 0422     RBC, UA Innumerable /hpf      WBC, UA Innumerable /hpf      Epithelial Cells Occasional /hpf      Bacteria, UA Moderate /hpf      Fine granular casts 4-5 /lpf     Urine culture [[id number]] Collected:  02/09/20 0242    Lab Status: In process Specimen:  Urine, Clean Catch Updated:  02/09/20 0421    POCT urinalysis dipstick [[id number]]  (Abnormal) Resulted:  02/09/20 0246    Lab Status:  Final result Specimen:  Urine Updated:  02/09/20 0246    Urine Macroscopic, POC [[id number]]  (Abnormal) Collected:  02/09/20 0242    Lab Status:  Final result Specimen:  Urine Updated:  02/09/20 0243     Color, UA Nancy     Clarity, UA Cloudy     pH, UA 5 5     Leukocytes, UA Small     Nitrite, UA Positive     Protein, UA >=300 mg/dl      Glucose, UA Negative mg/dl      Ketones, UA Trace mg/dl      Urobilinogen, UA 0 2 E U /dl      Bilirubin, UA Interference- unable to analyze     Blood, UA Large     Specific [place], UA 1 020    Narrative:       CLINITEK RESULT    Lactic Acid x2 [[id number]]  (Abnormal) Collected:  02/09/20 0203    Lab Status:  Final result Specimen:  Blood from Arm, Right Updated:  02/09/20 0239     LACTIC ACID 2 2 mmol/L     Narrative:       Result may be elevated if tourniquet was used during collection  Troponin I [[id number]]  (Abnormal) Collected:  02/09/20 0031    Lab Status:  Final result Specimen:  Blood from Hand, Right Updated:  02/09/20 0102     Troponin I 0 06 ng/mL     Lactic Acid x2 [[id number]]  (Abnormal) Collected:  02/08/20 2354    Lab Status:  Final result Specimen:  Blood from Arm, Right Updated:  02/09/20 0044     LACTIC ACID 3 0 mmol/L     Narrative:       Result may be elevated if tourniquet was used during collection      Protime-INR [[id number]]  (Abnormal) Collected:  02/09/20 0023    Lab Status:  Final result Specimen:  Blood from Arm, Right Updated:  02/09/20 0041     Protime 17 1 seconds      INR 1 37    APTT [[id number]]  (Normal) Collected:  02/09/20 0023    Lab Status:  Final result Specimen:  Blood from Arm, Right Updated:  02/09/20 0041     PTT 28 seconds     Comprehensive metabolic panel [[id number]]  (Abnormal) Collected:  02/08/20 2354    Lab Status:  Final result Specimen:  Blood from Arm, Right Updated:  02/09/20 0039     Sodium 135 mmol/L      Potassium 5 2 mmol/L      Chloride 101 mmol/L      CO2 21 mmol/L      ANION GAP 13 mmol/L      BUN 39 mg/dL      Creatinine 2 22 mg/dL      Glucose 232 mg/dL      Calcium 8 1 mg/dL      AST 58 U/L      ALT 32 U/L      Alkaline Phosphatase 64 U/L      Total Protein 6 6 g/dL      Albumin 2 7 g/dL      Total Bilirubin 1 48 mg/dL      eGFR 29 ml/min/1 73sq m     Narrative:       National Kidney Disease Foundation guidelines for Chronic Kidney Disease (CKD):     Stage 1 with normal or high GFR (GFR > 90 mL/min/1 73 square meters)    Stage 2 Mild CKD (GFR = 60-89 mL/min/1 73 square meters)    Stage 3A Moderate CKD (GFR = 45-59 mL/min/1 73 square meters)    Stage 3B Moderate CKD (GFR = 30-44 mL/min/1 73 square meters)    Stage 4 Severe CKD (GFR = 15-29 mL/min/1 73 square meters)    Stage 5 End Stage CKD (GFR <15 mL/min/1 73 square meters)  Note: GFR calculation is accurate only with a steady state creatinine    CBC and differential [[id number]]  (Abnormal) Collected:  02/08/20 2354    Lab Status:  Final result Specimen:  Blood from Arm, Right Updated:  02/09/20 0005     WBC 20 07 Thousand/uL      RBC 4 57 Million/uL      Hemoglobin 13 7 g/dL      Hematocrit 43 3 %      MCV 95 fL      MCH 30 0 pg      MCHC 31 6 g/dL      RDW 13 5 %      MPV 10 3 fL      Platelets 201 Thousands/uL      nRBC 0 /100 WBCs      Neutrophils Relative 83 %      Immat GRANS % 2 %      Lymphocytes Relative 4 %      Monocytes Relative 11 %      Eosinophils Relative 0 %      Basophils Relative 0 % Neutrophils Absolute 16 54 Thousands/µL      Immature Grans Absolute 0 44 Thousand/uL      Lymphocytes Absolute 0 78 Thousands/µL      Monocytes Absolute 2 27 Thousand/µL      Eosinophils Absolute 0 00 Thousand/µL      Basophils Absolute 0 04 Thousands/µL                  PE Study with CT Abdomen and Pelvis with contrast   Final Result by Abeba Larson DO (02/09 1209)      No evidence of pulmonary embolus      Malposition left-sided ureteral stent with its tip in the distal ureter  Proximal portion of the stent coiled within the renal pelvis  Mild left-sided perinephric stranding  Clinical correlation for infection  I personally discussed this study with Katalina Mijares on 2/9/2020 at 2:00 AM                         Workstation performed: [id number]         XR chest 1 view portable   ED Interpretation by Nhung Leone DO (02/09 0056)   Hazy LLL opacity      Final Result by lAina Reynolds MD (02/09 0855)      Shallow inspiratory effort mild accentuation of the cardiomediastinal silhouette  Subsegmental atelectasis at the left base              Workstation performed: [id number]                    Procedures  CriticalCare Time  Performed by: Nhung Leone DO  Authorized by: Nhung Leone DO     Critical care provider statement:     Critical care time (minutes):  60    Critical care time was exclusive of:  Separately billable procedures and treating other patients and teaching time    Critical care was necessary to treat or prevent imminent or life-threatening deterioration of the following conditions:  Circulatory failure, cardiac failure, dehydration, CNS failure or compromise, metabolic crisis, sepsis and shock    Critical care was time spent personally by me on the following activities:  Obtaining history from patient or surrogate, development of treatment plan with patient or surrogate, examination of patient, evaluation of patient's response to treatment, re-evaluation of patient's condition, ordering and review of radiographic studies, ordering and performing treatments and interventions, discussions with consultants, review of old charts and ordering and review of laboratory studies    I assumed direction of critical care for this patient from another provider in my specialty: no         ECG 12 Lead Documentation  Date/Time: today/date: 2/9/2020, obtained at 23:41  Performed by: Garett Wilks    ECG reviewed by me, the ED Provider: yes    Patient location:  ED   Previous ECG:  Changes noted   Rate:  154  ECG rate assessment: normal    Rhythm:  Atrial fibrillation    Ectopy:  Isolated PVC    QRS axis:  Normal  Intervals: normal   Q waves: None   ST segments:  Nonspecific changes   T waves: normal      Impression:  Rapid AFib, with nonspecific ST changes and isolated PVC      ECG 12 Lead Documentation  Date/Time: today/date: 2/9/2020, obtained at 00:09  Performed by: Garett Wilks    ECG reviewed by me, the ED Provider: yes    Patient location:  ED   Previous ECG:  Changes noted    Rate:  164  ECG rate assessment: normal    Rhythm: sinus tachycardia    Ectopy:  none    QRS axis:  Normal  Intervals: normal   Q waves: None   ST segments:  Nonspecific changes  T waves: normal      Impression:  Sinus tachycardia, nonspecific ST changes              ED Course  ED Course as of Feb 09 1753   Sat Feb 08, 2020   2354 Called to bedside as pt's HR increased to the 160's, concerning for afib  Pt was then found to be in [address] [place] [address] with a HR in the 230's with worsening hypotension  Pt was initially awake and alert, however then became unresponsive and pulseless  CPR was immediately initiated with defibrillation at 300 J  HR then found to be sinus tachycardia, however patient remained pulseless and apneic  CPR was continued and patient was ventilated  Pt then became bradycardic to the 40's  Pt given atropine with ROSC  HR returned to sinus tachycardia    While preparing to intubate the patient, he regained consciousness  Pt placed on NC supplemental oxygen  Pt returned to baseline mental status  Will start pt on amiodarone for vtach  Hany Mail Feb 09, 2020   0020 Of note, patient's labs were drawn prior to his cardiac arrest   Except for troponin  7539 Patient persistently hypotensive at this time  Concern for sepsis given patient's presenting symptoms and leukocytosis  Will continue with IV fluid  Will also order Levophed for further blood pressure management  Will hold on continuous amiodarone infusion for now due to blood pressure   Blood Pressure(!): 59/48   0024 Obtained prior to the cardiac arrest   Concern for sepsis  Will start antibiotics  WBC(!): 20 07   0043 Creatinine 1 3 early this month   Creatinine(!): 2 22   0044 Pt receiving IVF now, will plan to recheck   LACTIC ACID(!!): 3 0   0112 Patient's blood pressure was initially improving on low-dose Levophed wall receiving IV fluid  Now that IV fluid has finished, patient's blood pressure is trending down regardless of Levophed increases  Will give additional IV fluid      0138 Patient resting comfortably at this time  Patient offering no complaints, remaining awake and alert  Patient's heart rate is now normal sinus rhythm in the 80s       0159 Spoke with Dr Jaleesa Francisco, critical care attending, will accept patient to ICU  Agrees with current management  2818 Pt unable to urinate at this time  Will bladder scan and straight cath for UA                      Initial Sepsis Screening     Row Name 02/09/20 0206                Is the patient's history suggestive of a new or worsening infection? (!) Yes (Proceed)  -AM        Suspected source of infection  urinary tract infection;suspect infection, source unknown  -AM        Are two or more of the following signs & symptoms of infection both present and new to the patient? (!) Yes (Proceed)  -AM        Indicate SIRS criteria  Leukocytosis (WBC > 94108 IJL); Altered mental status; Tachycardia > 90 bpm;Tachypnea > 20 resp per min  -AM        If the answer is yes to both questions, suspicion of sepsis is present          If severe sepsis is present AND tissue hypoperfusion perists in the hour after fluid resuscitation or lactate > 4, the patient meets criteria for SEPTIC SHOCK          Are any of the following organ dysfunction criteria present within 6 hours of suspected infection and SIRS criteria that are NOT considered to be chronic conditions? (!) Yes  -AM        Organ dysfunction  Creatinine > 0 5 mg/dl ABOVE BASELINE;Lactate > 2 0 mmol/L  -AM        Date of presentation of severe sepsis  02/08/20  -AM        Time of presentation of severe sepsis  2354 -AM        Tissue hypoperfusion persists in the hour after crystalloid fluid administration, evidenced, by either:  SBP < 90 mm/Hg ( ___ mm/Hg in comment field); Decrease in SBP by > 40 points mm/Hg; Mean arterial pressure < 65 mm/Hg ( ___ mm Hg in comment field)  -AM        Was hypotension present within one hour of the conclusion of crystalloid fluid administration?   Yes  -AM        Date of presentation of septic shock  02/09/20  -AM        Time of presentation of septic shock  2354 -AM          User Key  (r) = Recorded By, (t) = Taken By, (c) = Cosigned By    [address] Name Provider Type    AM Mitra Zurita DO Physician                  MDM      Disposition  Final diagnoses:   Septic shock [facility])   Ventricular tachycardia ([facility] )   Cardiac arrest ([facility] )   Acute kidney injury ([facility] )     Time reflects when diagnosis was documented in both MDM as applicable and the Disposition within this note     Time User Action Codes Description Comment    2/9/2020  2:09 AM [place], Susan Add [A41 9,  R65 21] Septic shock ([facility] )     2/9/2020  2:09 AM [place], Susan Add [I47 2] Ventricular tachycardia ([facility] )     2/9/2020  2:09 AM [place], Susan Add [I46 9] Cardiac arrest ([facility] )     2/9/2020  2:09 AM Susan, Susan Add [N17 9] Acute kidney injury ([facility] ) 2/9/2020  2:29 AM Froylan Yelitza Modify [A41 9,  R65 21] Septic shock ([facility] )     2/9/2020  3:38 AM Froylan Torre Add [N20 0] Kidney stone       ED Disposition     ED Disposition Condition Date/Time Comment    Admit Zehra Fox Feb 9, 2020  2:03 AM Case was discussed with ICU and the patient's admission status was agreed to be Admission Status: inpatient status to the service of Dr Mario Pwoers   Follow-up Information    None         Current Discharge Medication List      CONTINUE these medications which have NOT CHANGED    Details   amLODIPine-benazepril (LOTREL 5-10) 5-10 MG per capsule Take 1 capsule by mouth daily  Refills: 3      aspirin (ECOTRIN LOW STRENGTH) 81 mg EC tablet Take 81 mg by mouth daily      Glucosamine HCl (GLUCOSAMINE PO) Take 1 tablet by mouth daily      metFORMIN (GLUCOPHAGE-XR) 500 mg 24 hr tablet Take 1,000 mg by mouth 2 (two) times a day  Refills: 3      Multiple Vitamin (MULTIVITAMIN) tablet Take 1 tablet by mouth daily      oxyCODONE (ROXICODONE) 5 mg immediate release tablet Take 1-2 tablets every 6 hours prn  Qty: 8 tablet, Refills: 0    Associated Diagnoses: Kidney stone; Bladder stones      pravastatin (PRAVACHOL) 40 mg tablet Take 40 mg by mouth daily  Refills: 3      Saw Palmetto, Serenoa repens, (SAW PALMETTO BERRY PO) Take 1 tablet by mouth daily      tamsulosin (FLOMAX) 0 4 mg TAKE ONE CAPSULE BY MOUTH IN THE EVENING  Qty: 30 capsule, Refills: 0    Associated Diagnoses: BPH without obstruction/lower urinary tract symptoms      phenazopyridine (PYRIDIUM) 200 mg tablet Take 1 tablet (200 mg total) by mouth 3 (three) times a day as needed for bladder spasms (Dysuria)  Qty: 10 tablet, Refills: 0    Associated Diagnoses: Bladder stones           No discharge procedures on file      ED Provider  Electronically Signed by           Kiana Landaverde DO  02/09/20 6503

## 2020-02-09 NOTE — CONSULTS
Consultation - Urology   Violet Minaya 71 y o  male MRN: [id number]  Unit/Bed#: ICU 14 Encounter: [id number] 2/9/2020           Assessment/Plan      Assessment:  60-year-old male 3 days post cystourethroscopy with laser of bladder stones, ureteroscopy with laser lithotripsy of left upper ureteral stones now with evidence of sepsis  Now status post cardiac arrest with successful resuscitation  AK I-likely secondary to dehydration, intravenous contrast  Plan:  I agree with antibiotics and present therapy  I reviewed his CT scan  The stent has a migrated up but there is no hydronephrosis and he is voiding well  He has no flank pain  I do not feel any additional intervention is needed at this time  The Dangler of the stent was left long so stent removal will likely be possible cystoscopically without need for repeat anesthesia or ureteroscopy  Will follow with you  Would continue to follow renal function and if it remains elevated consider nephrology evaluation  History of Present Illness   60-year-old male now 3 days post cystoscopy with laser of bladder stones and ureteroscopy with laser lithotripsy/stent placement who notes he initially did well postoperatively but then began to feel flu-like  He had chills, nausea/vomiting and urinary frequency  Last night he passed out and was brought to the emergency room where he had a cardiac arrest with successful resuscitation  At the present time he feels well  He has no flank pain  He notes he is voiding well at this time      Attending: Yfn Nuñez MD  Reason for Consult / Principal Problem:  Patient Active Problem List   Diagnosis    Kidney stone    BPH without obstruction/lower urinary tract symptoms    Bladder stones    Septic shock (HCC)    UTI (urinary tract infection)    DAVINA (acute kidney injury) ([facility] [address] )    Cardiac arrest with successful resuscitation [facility])       Inpatient consult to Urology  Consult performed by: Gauri Byers MD  Consult ordered by: DMITRY Graves          Review of Systems   Constitutional: Positive for chills and fatigue  Negative for diaphoresis and fever  HENT: Negative  Eyes: Negative  Respiratory: Negative  Cardiovascular: Positive for chest pain  Gastrointestinal: Negative  Endocrine: Negative  Genitourinary: Positive for frequency  Negative for flank pain  See HPI   Musculoskeletal: Negative  Skin: Negative  Allergic/Immunologic: Negative  Neurological: Negative  Hematological: Negative  Psychiatric/Behavioral: Negative          Historical Information   Allergies   Allergen Reactions    Penicillins Hives     Past Medical History:   Diagnosis Date    Arthritis     Diabetes ([address] )     High blood pressure     Hyperlipidemia     Kidney stones     Neck pain     Wears glasses      Past Surgical History:   Procedure Laterality Date    COLONOSCOPY      FL RETROGRADE PYELOGRAM  2/6/2020    HERNIA REPAIR      JOINT REPLACEMENT Right     [place] CYSTO/URETERO W/LITHOTRIPSY &INDWELL STENT INSRT Left 2/6/2020    Procedure: CYSTO URETEROSCOPY W/ LITHO HOLMIUM LASER, RETROGRADE PYELOGRAM AND STENT URETERAL; LASER BLADDER STONES; Cystolithopaxy;  Surgeon: Katherin Shone, MD;  Location: [facility];  Service: Urology    REPLACEMENT TOTAL KNEE Right     TONSILLECTOMY       Social History   Social History     Substance and Sexual Activity   Alcohol Use Never    Frequency: Never     Social History     Substance and Sexual Activity   Drug Use Never     Social History     Tobacco Use   Smoking Status Former Smoker    Years: 20 00    Last attempt to quit: 2/4/2010    Years since quitting: 10 0   Smokeless Tobacco Never Used     Family History: non-contributory    Meds/Allergies   all current active meds have been reviewed    Current Facility-Administered Medications:     acetaminophen (TYLENOL) tablet 975 mg, 975 mg, Oral, Q8H PRN, Freddy Hashimoto, MD    amiodarone (CORDARONE) 900 mg in dextrose 5 % 500 mL infusion, 0 5 mg/min, Intravenous, Continuous, DMITRY Murillo, Last Rate: 16 7 mL/hr at 02/09/20 0813, 0 5 mg/min at 02/09/20 0813    cefTRIAXone (ROCEPHIN) 2,000 mg in dextrose 5 % 50 mL IVPB, 2,000 mg, Intravenous, Q24H, DMITRY Chavis    heparin (porcine) subcutaneous injection 5,000 Units, 5,000 Units, Subcutaneous, Q8H [address], DMITRY Chavis, 5,000 Units at 02/09/20 0603    insulin lispro (HumaLOG) 100 units/mL subcutaneous injection 1-5 Units, 1-5 Units, Subcutaneous, Q6H [address], 2 Units at 02/09/20 0603 **AND** Fingerstick Glucose (POCT), , , Q6H, DMITRY Chavis    lactated ringers infusion, 125 mL/hr, Intravenous, Continuous, DMITRY Chavis, Last Rate: 125 mL/hr at 02/09/20 0340, 125 mL/hr at 02/09/20 0340    norepinephrine (LEVOPHED) 4 mg (STANDARD CONCENTRATION) IV in sodium chloride 0 9% 250 mL, 1-30 mcg/min, Intravenous, Titrated, DMITRY Chavis, Last Rate: 7 5 mL/hr at 02/09/20 1120, 2 mcg/min at 02/09/20 1120    ondansetron (ZOFRAN) injection 4 mg, 4 mg, Intravenous, Once, DMITRY Chavis, Stopped at 02/09/20 0008    Current Facility-Administered Medications:  acetaminophen 975 mg Oral Q8H PRN John Hoyos MD    amiodarone 0 5 mg/min Intravenous Continuous DMITRY Murillo Last Rate: 0 5 mg/min (02/09/20 0813)   cefTRIAXone 2,000 mg Intravenous Q24H DMITRY Chavis    heparin (porcine) 5,000 Units Subcutaneous [facility] DMITRY Chavis    insulin lispro 1-5 Units Subcutaneous Q6H [address] DMITRY Chavis    lactated ringers 125 mL/hr Intravenous Continuous Theador Paulie, CRNP Last Rate: 125 mL/hr (02/09/20 0340)   norepinephrine 1-30 mcg/min Intravenous Titrated Thechristin Paulie, CRNP Last Rate: 2 mcg/min (02/09/20 1120)   ondansetron 4 mg Intravenous Once Thechristin Paulie, CRNP        acetaminophen    amiodarone 0 5 mg/min Last Rate: 0 5 mg/min (02/09/20 0813)   lactated ringers 125 mL/hr Last Rate: 125 mL/hr (02/09/20 0340)   norepinephrine 1-30 mcg/min Last Rate: 2 mcg/min (02/09/20 1120)       Objective   Vitals: Blood pressure 131/82, pulse 80, temperature 98 2 °F (36 8 °C), temperature source Temporal, resp  rate (!) 33, height 6' 1" (1 854 m), weight 107 kg (235 lb 7 2 oz), SpO2 95 %  I/O last 24 hours: In: 6923 [I V :917; IV Piggyback:4150]  Out: 325 [Urine:325]    Invasive Devices     Peripheral Intravenous Line            Peripheral IV 02/08/20 Left Antecubital less than 1 day    Peripheral IV 02/08/20 Left Wrist less than 1 day    Peripheral IV 02/08/20 Right Forearm less than 1 day          Drain            Ureteral Drain/Stent Left ureter 6 Fr  2 days                Physical Exam   Constitutional: He is oriented to person, place, and time  He appears well-developed and well-nourished  HENT:   Head: Normocephalic and atraumatic  Eyes: Conjunctivae are normal    Neck: Neck supple  Cardiovascular: Normal rate  Pulmonary/Chest: Effort normal    Abdominal: Soft  He exhibits no distension and no mass  There is no tenderness  There is no rebound and no guarding  No hernia  No CVA tenderness   Genitourinary: Penis normal    Genitourinary Comments: Testes descended and normal to palpation   Neurological: He is alert and oriented to person, place, and time  Skin: Skin is warm and dry  Psychiatric: He has a normal mood and affect  His behavior is normal  Judgment and thought content normal        Lab Results:   I have personally reviewed pertinent reports      CBC:   Lab Results   Component Value Date    WBC 19 28 (H) 02/09/2020    HGB 12 8 02/09/2020    HCT 40 3 02/09/2020    MCV 95 02/09/2020     02/09/2020    MCH 30 0 02/09/2020    MCHC 31 8 02/09/2020    RDW 13 6 02/09/2020    MPV 9 8 02/09/2020    NRBC 0 02/09/2020     CMP:   Lab Results   Component Value Date    SODIUM 138 02/09/2020     02/09/2020    CO2 24 02/09/2020    BUN 39 (H) 02/09/2020    CREATININE 2 11 (H) 02/09/2020    CALCIUM 8 0 (L) 02/09/2020    AST 58 (H) 02/08/2020    ALT 32 02/08/2020    ALKPHOS 64 02/08/2020    EGFR 31 02/09/2020     Urinalysis:   Lab Results   Component Value Date    COLORU Nancy 02/09/2020    CLARITYU Cloudy 02/09/2020    SPECGRAV 1 020 02/09/2020    PHUR 5 5 02/09/2020    LEUKOCYTESUR Small (A) 02/09/2020    NITRITE Positive (A) 02/09/2020    GLUCOSEU Negative 02/09/2020    KETONESU Trace (A) 02/09/2020    BILIRUBINUR Interference- unable to analyze (A) 02/09/2020    BLOODU Large (A) 02/09/2020       Imaging Studies: I have personally reviewed pertinent films in PACS  Xr Chest 1 View Portable    Result Date: 2/9/2020  Narrative: CHEST INDICATION:   vtach  COMPARISON:  None EXAM PERFORMED/VIEWS:  XR CHEST PORTABLE FINDINGS: Mild cardiomegaly and uncoiling of the aorta with a shallow inspiratory effort  Hazy patchy opacities at the left base likely represent subsegmental atelectasis  No dense consolidation  No pneumothorax or pleural effusion  Osseous structures appear within normal limits for patient age  Impression: Shallow inspiratory effort mild accentuation of the cardiomediastinal silhouette  Subsegmental atelectasis at the left base  Workstation performed: [id number]     Fl Retrograde Pyelogram    Result Date: 2/9/2020  Narrative: LEFT RETROGRADE PYELOGRAM INDICATION:   N20 0: Calculus of kidney  COMPARISON: None IMAGES:  3 FLUOROSCOPY TIME:   3 MINS 17 SECS CONTRAST:  5 mL of iohexol (OMNIPAQUE) FINDINGS: Fluoroscopic guidance provided for retrograde pyelogram  3 fluoroscopic views over the left kidney recorded  Stone at the level of the UPJ  Partial opacification of a mildly dilated renal collecting system with calyceal blunting, incompletely opacified on recorded image  Placement of double-J ureteral stent, proximal and within the renal pelvis  Osseous and soft tissue detail limited by technique  Impression: Fluoroscopic guidance provided for left retrograde pyelogram  Please see procedure report for further details   Workstation performed: [id number]     Pe Study With Ct Abdomen And Pelvis With Contrast    Result Date: 2/9/2020  Narrative: CT PULMONARY ANGIOGRAM OF THE CHEST AND CT ABDOMEN AND PELVIS WITH INTRAVENOUS CONTRAST INDICATION:   Nausea/vomiting recent cysto with ureteral stent placement, cardiac arrest with vtach upon arrival to ED  COMPARISON:  CT scan dated November 30, 2019 TECHNIQUE:  CT examination of the chest, abdomen and pelvis was performed  Thin section CT angiographic technique was used in the chest in order to evaluate for pulmonary embolus and coronal 3D MIP postprocessing was performed on the acquisition scanner  Axial, sagittal, and coronal 2D reformatted images were created from the source data and submitted for interpretation  Radiation dose length product (DLP) for this visit:  [phone] mGy-cm   This examination, like all CT scans performed in the [facility], was performed utilizing techniques to minimize radiation dose exposure, including the use of iterative reconstruction and automated exposure control  IV Contrast:  100 mL of iohexol (OMNIPAQUE) Enteric Contrast:  Enteric contrast was not administered  FINDINGS: CHEST PULMONARY ARTERIAL TREE:  No pulmonary embolus is seen  LUNGS:  The trachea and central bronchial tree are patent  Atelectasis seen within the lung bases  PLEURA:  Unremarkable  HEART/AORTA:  Unremarkable for patient's age  MEDIASTINUM AND ERICA:  Unremarkable  CHEST WALL AND LOWER NECK:   Unremarkable  ABDOMEN LIVER/BILIARY TREE:  One or more subcentimeter sharply circumscribed low-density hepatic lesion(s) are noted, too small to accurately characterize, but statistically most likely to represent subcentimeter hepatic cysts  No suspicious solid hepatic lesion is identified  Hepatic contours are normal   No biliary dilatation  GALLBLADDER:  No calcified gallstones  No pericholecystic inflammatory change  SPLEEN:  Unremarkable  PANCREAS:  Unremarkable  ADRENAL GLANDS:  Unremarkable  KIDNEYS/URETERS:  Left-sided ureteral stent is seen with its tip in the distal ureter  Proximal portion of the stent is seen coiled within the renal pelvis  Tiny droplet of air are seen within the left renal collecting system  Mild left-sided perinephric stranding is seen  Cyst in the left kidney are visualized  Nonobstructing bilateral intrarenal calculi are seen  STOMACH AND BOWEL:  There is colonic diverticulosis without evidence of acute diverticulitis  APPENDIX:  No findings to suggest appendicitis  ABDOMINOPELVIC CAVITY:  Subcentimeter retroperitoneal and periaortic lymph nodes are visualized  VESSELS:  Atherosclerotic changes are present  No evidence of aneurysm  Ascending aorta measures up to 4 4 cm PELVIS REPRODUCTIVE ORGANS:  Unremarkable for patient's age  URINARY BLADDER:  Tiny droplets of air are seen within the urinary bladder  ABDOMINAL WALL/INGUINAL REGIONS:  Unremarkable  OSSEOUS STRUCTURES:  No acute fracture or destructive osseous lesion  Impression: No evidence of pulmonary embolus Malposition left-sided ureteral stent with its tip in the distal ureter  Proximal portion of the stent coiled within the renal pelvis  Mild left-sided perinephric stranding  Clinical correlation for infection  I personally discussed this study with Neema Key on 2/9/2020 at 2:00 AM  Workstation performed: [id number]       EKG, Pathology, and Other Studies: I have personally reviewed pertinent reports          Code Status: Level 1 - Full Code       Shade Coleman MD

## 2020-02-09 NOTE — ED NOTES
Pt reports feeling nauseas stating he is going to pass out  Provider made aware  Provider at bedside        Beverly Cuello RN  02/09/20 0199

## 2020-02-09 NOTE — CONSULTS
Consultation -  Cardiac Electrophysiology  Oren Griffin 71 y o  male MRN: [id number]  Unit/Bed#: ICU 14 Encounter: [id number]      Assessment:  Principal Problem:      Cardiac arrest with successful resuscitation ([facility] )   - Syncope at home   - rapid atrial fibrillation with HR in 180s, hypotension leading to [facility] (no primary evidence of ECG)   - Due to unresponsiveness in [facility], CPR performed with defibrillation leading ROSC   - Amiodarone bolus and drip started   - Bradycardia/apnea occurred but prior to intuabtion, patient woke up with full neurologic recovery    - Patient started on sepsis protocol afterwards    - tele with atrial tachycardia, nonsustained and PVCs   - on amiodarone gtt    Active Problems:    Septic shock ([facility] )   - Secondary to UTI from cystoscopy and stent placement on 2/6   - On IV cefepime, IVF and levophed    - Blood cx and Urine cx sent      Kidney stone   - s/p stent placement on 2/6/20   - urology following       BPH without obstruction/lower urinary tract symptoms    UTI (urinary tract infection)   - UA pos for leukocytosis, nitrites, ketones, blood      DAVINA (acute kidney injury) ([facility] )   - Cr 2 2, was 1 3 on 1/28/20    Plan:  1  atrial fibrillation and ventricular tachycardia likely due to septic shock  2  Obtain transthoracic echocardiogram   3  If EF normal, then pt will need ischemic evaluation once clinically stable, likely with stress test (given DAVINA)  4  If stress test normal, then would consider monitor placement, a loop recorder to assess atrial fibrillation and VT burden; he has significant atrial fibrillation burden then would consider anticoagulation  5  Patient will need beta-blocker as well once recovered from septic shock  6  No role for ICD for now as VT was in setting of shock and occurred after atrial fibrillation   7  Recommend switching to oral amiodarone when stable, and continuing for 2 months  8  F/u in EP clinic after discharge       History of Present Illness Physician Requesting Consult: Zoila Meza MD  Reason for Consult / Principal Problem:  Atrial fibrillation/ventricle tachycardia  HPI: Tobi Fong is a 71y o  year old male with history of diabetes type 2, hypertension, hyperlipidemia and kidney stones who presented after having 2 days of nausea, chills, vomiting and urinary frequency  Per wife patient was getting ready to go to the emergency room  She set him up and patient slumped over and syncopized  This lasted only for a minute or 2 before patient regained consciousness  Patient felt dizziness while lying supine prior to having the syncopal episode  He did not feel any chest pain, palpitations, blurry vision or numbness tingling prior to the episode  Per records when he presented to the emergency room patient developed rapid atrial fibrillation in the 180s with hypotension that was followed by ventricle tachycardia  ECG or telemetry strips are not available to review from this episode  Patient had CPR with defibrillation leading to return of spontaneous circulation  He was given amiodarone bolus and placed on amiodarone drip  He was being prepared for intubation in the setting of bradycardia and apnea but he woke up with full neurological recovery and intubation was aborted  Patient's blood pressure personally remained low despite fluid resuscitation  He was placed on IV pressors with Levophed infusion  His lab work showed white blood cell count of 20 and lactic acidosis of 3 with positive urinary analysis  He had ureter stent placed on recently on February 6 for kidney stone  He is currently being treated for urosepsis in the ICU  Patient denies having any cardiac issues in the past   He reports having a stress test several years ago (see below)  Per patient the stress test was normal   He did not ever require heart catheterization    He denies having any palpitation at baseline or chest pain, dyspnea on exertion, lower extremity edema, orthopnea, PND, nausea or vomiting at baseline  He was having mild retroperitoneal pain from the kidney stones and therefore visited Urology for further treatment  Inpatient consult to Cardiology  Consult performed by: Sinai Vasquez MD  Consult ordered by: DMITRY Graves          Review of Systems:  Per HPI, otherwise negative  Historical Information   Past Medical History:   Diagnosis Date    Arthritis     Diabetes ([address] )     High blood pressure     Hyperlipidemia     Kidney stones     Neck pain     Wears glasses      Past Surgical History:   Procedure Laterality Date    COLONOSCOPY      HERNIA REPAIR      JOINT REPLACEMENT Right     FL CYSTO/URETERO W/LITHOTRIPSY &INDWELL STENT INSRT Left 2/6/2020    Procedure: CYSTO URETEROSCOPY W/ LITHO HOLMIUM LASER, RETROGRADE PYELOGRAM AND STENT URETERAL; LASER BLADDER STONES; Cystolithopaxy;  Surgeon: Katherin Shone, MD;  Location: [facility];  Service: Urology    REPLACEMENT TOTAL KNEE Right     TONSILLECTOMY          History reviewed  No pertinent family history    Social History     Socioeconomic History    Marital status: /Civil Union     Spouse name: Not on file    Number of children: Not on file    Years of education: Not on file    Highest education level: Not on file   Occupational History    Not on file   Social Needs    Financial resource strain: Not on file    Food insecurity:     Worry: Not on file     Inability: Not on file    Transportation needs:     Medical: Not on file     Non-medical: Not on file   Tobacco Use    Smoking status: Former Smoker     Years: 20 00     Last attempt to quit: 2/4/2010     Years since quitting: 10 0    Smokeless tobacco: Never Used   Substance and Sexual Activity    Alcohol use: Never     Frequency: Never    Drug use: Never    Sexual activity: Not on file   Lifestyle    Physical activity:     Days per week: Not on file     Minutes per session: Not on file    Stress: Not on file   Relationships    Social connections:     Talks on phone: Not on file     Gets together: Not on file     Attends [employer] service: Not on file     Active member of club or organization: Not on file     Attends meetings of clubs or organizations: Not on file     Relationship status: Not on file    Intimate partner violence:     Fear of current or ex partner: Not on file     Emotionally abused: Not on file     Physically abused: Not on file     Forced sexual activity: Not on file   Other Topics Concern    Not on file   Social History Narrative    Not on file     Social History     Tobacco Use   Smoking Status Former Smoker    Years: 20 00    Last attempt to quit: 2/4/2010    Years since quitting: 10 0   Smokeless Tobacco Never Used     Social History     Substance and Sexual Activity   Alcohol Use Never    Frequency: Never     Meds/Allergies     current meds:   Current Facility-Administered Medications   Medication Dose Route Frequency    amiodarone (CORDARONE) 900 mg in dextrose 5 % 500 mL infusion  0 5 mg/min Intravenous Continuous    cefTRIAXone (ROCEPHIN) 2,000 mg in dextrose 5 % 50 mL IVPB  2,000 mg Intravenous Q24H    heparin (porcine) subcutaneous injection 5,000 Units  5,000 Units Subcutaneous Q8H [address]    insulin lispro (HumaLOG) 100 units/mL subcutaneous injection 1-5 Units  1-5 Units Subcutaneous Q6H [address]    lactated ringers infusion  125 mL/hr Intravenous Continuous    norepinephrine (LEVOPHED) 4 mg (STANDARD CONCENTRATION) IV in sodium chloride 0 9% 250 mL  1-30 mcg/min Intravenous Titrated    ondansetron (ZOFRAN) injection 4 mg  4 mg Intravenous Once     Allergies   Allergen Reactions    Penicillins Hives       Objective   Vitals: Blood pressure 121/85, pulse 76, temperature 98 1 °F (36 7 °C), temperature source Temporal, resp  rate 19, weight 107 kg (235 lb 7 2 oz), SpO2 93 %  , Body mass index is 31 06 kg/m² ,   Orthostatic Blood Pressures      Most Recent Value   Blood Pressure  121/85 filed at 02/09/2020 0800   Patient Position - Orthostatic VS  Lying filed at 02/09/2020 0253            Intake/Output Summary (Last 24 hours) at 2/9/2020 0934  Last data filed at 2/9/2020 0913  Gross per 24 hour   Intake 4430 67 ml   Output 200 ml   Net 4230 67 ml       Invasive Devices     Peripheral Intravenous Line            Peripheral IV 02/08/20 Left Antecubital less than 1 day    Peripheral IV 02/08/20 Left Wrist less than 1 day    Peripheral IV 02/08/20 Right Forearm less than 1 day          Drain            Ureteral Drain/Stent Left ureter 6 Fr  2 days                  Physical Exam:  GEN: Oral Searing appears well, alert and oriented x 3, pleasant and cooperative   HEENT: pupils equal, round, and reactive to light; extraocular muscles intact  NECK: supple, no carotid bruits   HEART: regular rhythm, normal S1 and S2, no murmurs, clicks, gallops or rubs   LUNGS: clear to auscultation bilaterally; no wheezes, rales, or rhonchi   ABDOMEN: normal bowel sounds, soft, no tenderness, no distention  EXTREMITIES: peripheral pulses normal; no clubbing, cyanosis, or edema  NEURO: no focal findings   SKIN: normal without suspicious lesions on exposed skin    Lab Results:   Admission on 02/08/2020   Component Date Value    WBC 02/08/2020 20 07*    RBC 02/08/2020 4 57     Hemoglobin 02/08/2020 13 7     Hematocrit 02/08/2020 43 3     MCV 02/08/2020 95     MCH 02/08/2020 30 0     MCHC 02/08/2020 31 6     RDW 02/08/2020 13 5     MPV 02/08/2020 10 3     Platelets 15/79/8430 194     nRBC 02/08/2020 0     Neutrophils Relative 02/08/2020 83*    Immat GRANS % 02/08/2020 2     Lymphocytes Relative 02/08/2020 4*    Monocytes Relative 02/08/2020 11     Eosinophils Relative 02/08/2020 0     Basophils Relative 02/08/2020 0     Neutrophils Absolute 02/08/2020 16 54*    Immature Grans Absolute 02/08/2020 0 44*    Lymphocytes Absolute 02/08/2020 0 78     Monocytes Absolute 02/08/2020 2 27*    Eosinophils Absolute 02/08/2020 0 00     Basophils Absolute 02/08/2020 0 04     Sodium 02/08/2020 135*    Potassium 02/08/2020 5 2     Chloride 02/08/2020 101     CO2 02/08/2020 21     ANION GAP 02/08/2020 13     BUN 02/08/2020 39*    Creatinine 02/08/2020 2 22*    Glucose 02/08/2020 232*    Calcium 02/08/2020 8 1*    AST 02/08/2020 58*    ALT 02/08/2020 32     Alkaline Phosphatase 02/08/2020 64     Total Protein 02/08/2020 6 6     Albumin 02/08/2020 2 7*    Total Bilirubin 02/08/2020 1 48*    eGFR 02/08/2020 29     LACTIC ACID 02/09/2020 2 2*    LACTIC ACID 02/08/2020 3 0*    Protime 02/09/2020 17 1*    INR 02/09/2020 1 37*    PTT 02/09/2020 28     Procalcitonin 02/08/2020 26 20*    Blood Culture 02/08/2020 Received in Microbiology Lab  Culture in Progress   Blood Culture 02/08/2020 Received in Microbiology Lab  Culture in Progress       Troponin I 02/09/2020 0 06*    Color, UA 02/09/2020 Nancy     Clarity, UA 02/09/2020 Cloudy     pH, UA 02/09/2020 5 5     Leukocytes, UA 02/09/2020 Small*    Nitrite, UA 02/09/2020 Positive*    Protein, UA 02/09/2020 >=300*    Glucose, UA 02/09/2020 Negative     Ketones, UA 02/09/2020 Trace*    Urobilinogen, UA 02/09/2020 0 2     Bilirubin, UA 02/09/2020 Interference- unable to analyze*    Blood, UA 02/09/2020 Large*    Specific [place], UA 02/09/2020 1 020     RBC, UA 02/09/2020 Innumerable*    WBC, UA 02/09/2020 Innumerable*    Epithelial Cells 02/09/2020 Occasional     Bacteria, UA 02/09/2020 Moderate*    Fine granular casts 02/09/2020 4-5     Procalcitonin 02/09/2020 21 34*    Troponin I 02/09/2020 0 17*    Magnesium 02/09/2020 1 9     Phosphorus 02/09/2020 3 8     Sodium 02/09/2020 138     Potassium 02/09/2020 4 0     Chloride 02/09/2020 103     CO2 02/09/2020 24     ANION GAP 02/09/2020 11     BUN 02/09/2020 39*    Creatinine 02/09/2020 2 11*    Glucose 02/09/2020 244*    Calcium 02/09/2020 8 0*    eGFR 02/09/2020 31  WBC 02/09/2020 19 28*    RBC 02/09/2020 4 26     Hemoglobin 02/09/2020 12 8     Hematocrit 02/09/2020 40 3     MCV 02/09/2020 95     MCH 02/09/2020 30 0     MCHC 02/09/2020 31 8     RDW 02/09/2020 13 6     MPV 02/09/2020 9 8     Platelets 93/26/2446 205     nRBC 02/09/2020 0     Neutrophils Relative 02/09/2020 86*    Immat GRANS % 02/09/2020 2     Lymphocytes Relative 02/09/2020 3*    Monocytes Relative 02/09/2020 9     Eosinophils Relative 02/09/2020 0     Basophils Relative 02/09/2020 0     Neutrophils Absolute 02/09/2020 16 47*    Immature Grans Absolute 02/09/2020 0 37*    Lymphocytes Absolute 02/09/2020 0 62     Monocytes Absolute 02/09/2020 1 79*    Eosinophils Absolute 02/09/2020 0 01     Basophils Absolute 02/09/2020 0 02     Cholesterol 02/09/2020 114     Triglycerides 02/09/2020 178*    HDL, Direct 02/09/2020 13*    LDL Calculated 02/09/2020 65     Non-HDL-Chol (CHOL-HDL) 02/09/2020 101     Troponin I 02/09/2020 0 34*    POC Glucose 02/09/2020 224*       Imaging: I have personally reviewed pertinent reports  Xr Chest 1 View Portable    Result Date: 2/9/2020  Narrative: CHEST INDICATION:   vtach  COMPARISON:  None EXAM PERFORMED/VIEWS:  XR CHEST PORTABLE FINDINGS: Mild cardiomegaly and uncoiling of the aorta with a shallow inspiratory effort  Hazy patchy opacities at the left base likely represent subsegmental atelectasis  No dense consolidation  No pneumothorax or pleural effusion  Osseous structures appear within normal limits for patient age  Impression: Shallow inspiratory effort mild accentuation of the cardiomediastinal silhouette  Subsegmental atelectasis at the left base   Workstation performed: [id number]     Pe Study With Ct Abdomen And Pelvis With Contrast    Result Date: 2/9/2020  Narrative: CT PULMONARY ANGIOGRAM OF THE CHEST AND CT ABDOMEN AND PELVIS WITH INTRAVENOUS CONTRAST INDICATION:   Nausea/vomiting recent cysto with ureteral stent placement, cardiac arrest with vtach upon arrival to ED  COMPARISON:  CT scan dated November 30, 2019 TECHNIQUE:  CT examination of the chest, abdomen and pelvis was performed  Thin section CT angiographic technique was used in the chest in order to evaluate for pulmonary embolus and coronal 3D MIP postprocessing was performed on the acquisition scanner  Axial, sagittal, and coronal 2D reformatted images were created from the source data and submitted for interpretation  Radiation dose length product (DLP) for this visit:  [phone] mGy-cm   This examination, like all CT scans performed in the [facility], was performed utilizing techniques to minimize radiation dose exposure, including the use of iterative reconstruction and automated exposure control  IV Contrast:  100 mL of iohexol (OMNIPAQUE) Enteric Contrast:  Enteric contrast was not administered  FINDINGS: CHEST PULMONARY ARTERIAL TREE:  No pulmonary embolus is seen  LUNGS:  The trachea and central bronchial tree are patent  Atelectasis seen within the lung bases  PLEURA:  Unremarkable  HEART/AORTA:  Unremarkable for patient's age  MEDIASTINUM AND ERICA:  Unremarkable  CHEST WALL AND LOWER NECK:   Unremarkable  ABDOMEN LIVER/BILIARY TREE:  One or more subcentimeter sharply circumscribed low-density hepatic lesion(s) are noted, too small to accurately characterize, but statistically most likely to represent subcentimeter hepatic cysts  No suspicious solid hepatic lesion is identified  Hepatic contours are normal   No biliary dilatation  GALLBLADDER:  No calcified gallstones  No pericholecystic inflammatory change  SPLEEN:  Unremarkable  PANCREAS:  Unremarkable  ADRENAL GLANDS:  Unremarkable  KIDNEYS/URETERS:  Left-sided ureteral stent is seen with its tip in the distal ureter  Proximal portion of the stent is seen coiled within the renal pelvis  Tiny droplet of air are seen within the left renal collecting system    Mild left-sided perinephric stranding is seen  Cyst in the left kidney are visualized  Nonobstructing bilateral intrarenal calculi are seen  STOMACH AND BOWEL:  There is colonic diverticulosis without evidence of acute diverticulitis  APPENDIX:  No findings to suggest appendicitis  ABDOMINOPELVIC CAVITY:  Subcentimeter retroperitoneal and periaortic lymph nodes are visualized  VESSELS:  Atherosclerotic changes are present  No evidence of aneurysm  Ascending aorta measures up to 4 4 cm PELVIS REPRODUCTIVE ORGANS:  Unremarkable for patient's age  URINARY BLADDER:  Tiny droplets of air are seen within the urinary bladder  ABDOMINAL WALL/INGUINAL REGIONS:  Unremarkable  OSSEOUS STRUCTURES:  No acute fracture or destructive osseous lesion  Impression: No evidence of pulmonary embolus Malposition left-sided ureteral stent with its tip in the distal ureter  Proximal portion of the stent coiled within the renal pelvis  Mild left-sided perinephric stranding  Clinical correlation for infection  I personally discussed this study with Khang Wilkins on 2/9/2020 at 2:00 AM  Workstation performed: [id number]       Cardiac testing:     Stress echocardiogram 3/31/2015 at [facility]  The patient's target heart rate was achieved  The patient exhibited a hypertensive response with stress  Patient had 1/10 chest pressure at rest  Chest pressure increased in a linear fashion w/ exercise duration  Chest pressure peaked at 7/10 with radiation into the left neck/ jaw  Patient was given a SL NTG in the 6th minute of recovery (12:15pm) the patient's chest pressure resolved 3 minutes later  The patient will be following up with Dr Kimberly Paul 4/1/15  Normal EF       EKG:   ECG reviewed sinus rhythm with PVCs            SR on 1/28/20

## 2020-02-09 NOTE — PLAN OF CARE
Problem: Potential for Falls  Goal: Patient will remain free of falls  Description  INTERVENTIONS:  - Assess patient frequently for physical needs  -  Identify cognitive and physical deficits and behaviors that affect risk of falls    -  [place] fall precautions as indicated by assessment   - Educate patient/family on patient safety including physical limitations  - Instruct patient to call for assistance with activity based on assessment  - Modify environment to reduce risk of injury  - Consider OT/PT consult to assist with strengthening/mobility  Outcome: Progressing     Problem: PAIN - ADULT  Goal: Verbalizes/displays adequate comfort level or baseline comfort level  Description  Interventions:  - Encourage patient to monitor pain and request assistance  - Assess pain using appropriate pain scale  - Administer analgesics based on type and severity of pain and evaluate response  - Implement non-pharmacological measures as appropriate and evaluate response  - Consider cultural and social influences on pain and pain management  - Notify physician/advanced practitioner if interventions unsuccessful or patient reports new pain  Outcome: Progressing     Problem: INFECTION - ADULT  Goal: Absence or prevention of progression during hospitalization  Description  INTERVENTIONS:  - Assess and monitor for signs and symptoms of infection  - Monitor lab/diagnostic results  - Monitor all insertion sites, i e  indwelling lines, tubes, and drains  - Monitor endotracheal if appropriate and nasal secretions for changes in amount and color  - [place] appropriate cooling/warming therapies per order  - Administer medications as ordered  - Instruct and encourage patient and family to use good hand hygiene technique  - Identify and instruct in appropriate isolation precautions for identified infection/condition  Outcome: Progressing  Goal: Absence of fever/infection during neutropenic period  Description  INTERVENTIONS:  - Monitor WBC    Outcome: Progressing     Problem: SAFETY ADULT  Goal: Maintain or return to baseline ADL function  Description  INTERVENTIONS:  -  Assess patient's ability to carry out ADLs; assess patient's baseline for ADL function and identify physical deficits which impact ability to perform ADLs (bathing, care of mouth/teeth, toileting, grooming, dressing, etc )  - Assess/evaluate cause of self-care deficits   - Assess range of motion  - Assess patient's mobility; develop plan if impaired  - Assess patient's need for assistive devices and provide as appropriate  - Encourage maximum independence but intervene and supervise when necessary  - Involve family in performance of ADLs  - Assess for home care needs following discharge   - Consider OT consult to assist with ADL evaluation and planning for discharge  - Provide patient education as appropriate  Outcome: Progressing  Goal: Maintain or return mobility status to optimal level  Description  INTERVENTIONS:  - Assess patient's baseline mobility status (ambulation, transfers, stairs, etc )    - Identify cognitive and physical deficits and behaviors that affect mobility  - Identify mobility aids required to assist with transfers and/or ambulation (gait belt, sit-to-stand, lift, walker, cane, etc )  - [place] fall precautions as indicated by assessment  - Record patient progress and toleration of activity level on Mobility SBAR; progress patient to next Phase/Stage  - Instruct patient to call for assistance with activity based on assessment  - Consider rehabilitation consult to assist with strengthening/weightbearing, etc   Outcome: Progressing     Problem: DISCHARGE PLANNING  Goal: Discharge to home or other facility with appropriate resources  Description  INTERVENTIONS:  - Identify barriers to discharge w/patient and caregiver  - Arrange for needed discharge resources and transportation as appropriate  - Identify discharge learning needs (meds, wound care, etc )  - Arrange for interpretive services to assist at discharge as needed  - Refer to Case Management Department for coordinating discharge planning if the patient needs post-hospital services based on physician/advanced practitioner order or complex needs related to functional status, cognitive ability, or social support system  Outcome: Progressing     Problem: Knowledge Deficit  Goal: Patient/family/caregiver demonstrates understanding of disease process, treatment plan, medications, and discharge instructions  Description  Complete learning assessment and assess knowledge base    Interventions:  - Provide teaching at level of understanding  - Provide teaching via preferred learning methods  Outcome: Progressing     Problem: CARDIOVASCULAR - ADULT  Goal: Maintains optimal cardiac output and hemodynamic stability  Description  INTERVENTIONS:  - Monitor I/O, vital signs and rhythm  - Monitor for S/S and trends of decreased cardiac output  - Administer and titrate ordered vasoactive medications to optimize hemodynamic stability  - Assess quality of pulses, skin color and temperature  - Assess for signs of decreased coronary artery perfusion  - Instruct patient to report change in severity of symptoms  Outcome: Progressing  Goal: Absence of cardiac dysrhythmias or at baseline rhythm  Description  INTERVENTIONS:  - Continuous cardiac monitoring, vital signs, obtain 12 lead EKG if ordered  - Administer antiarrhythmic and heart rate control medications as ordered  - Monitor electrolytes and administer replacement therapy as ordered  Outcome: Progressing     Problem: GENITOURINARY - ADULT  Goal: Maintains or returns to baseline urinary function  Description  INTERVENTIONS:  - Assess urinary function  - Encourage oral fluids to ensure adequate hydration if ordered  - Administer IV fluids as ordered to ensure adequate hydration  - Administer ordered medications as needed  - Offer frequent toileting  - Follow urinary retention protocol if ordered  Outcome: Progressing  Goal: Absence of urinary retention  Description  INTERVENTIONS:  - Assess patients ability to void and empty bladder  - Monitor I/O  - Bladder scan as needed  - Discuss with physician/AP medications to alleviate retention as needed  - Discuss catheterization for long term situations as appropriate  Outcome: Progressing  Goal: Urinary catheter remains patent  Description  INTERVENTIONS:  - Assess patency of urinary catheter  - If patient has a chronic wilhelm, consider changing catheter if non-functioning  - Follow guidelines for intermittent irrigation of non-functioning urinary catheter  Outcome: Progressing     Problem: METABOLIC, FLUID AND ELECTROLYTES - ADULT  Goal: Electrolytes maintained within normal limits  Description  INTERVENTIONS:  - Monitor labs and assess patient for signs and symptoms of electrolyte imbalances  - Administer electrolyte replacement as ordered  - Monitor response to electrolyte replacements, including repeat lab results as appropriate  - Instruct patient on fluid and nutrition as appropriate  Outcome: Progressing  Goal: Fluid balance maintained  Description  INTERVENTIONS:  - Monitor labs   - Monitor I/O and WT  - Instruct patient on fluid and nutrition as appropriate  - Assess for signs & symptoms of volume excess or deficit  Outcome: Progressing  Goal: Glucose maintained within target range  Description  INTERVENTIONS:  - Monitor Blood Glucose as ordered  - Assess for signs and symptoms of hyperglycemia and hypoglycemia  - Administer ordered medications to maintain glucose within target range  - Assess nutritional intake and initiate nutrition service referral as needed  Outcome: Progressing

## 2020-02-10 ENCOUNTER — APPOINTMENT (INPATIENT)
Dept: NON INVASIVE DIAGNOSTICS | Facility: HOSPITAL | Age: 70
DRG: 871 | End: 2020-02-10
Payer: COMMERCIAL

## 2020-02-10 LAB
ANION GAP SERPL CALCULATED.3IONS-SCNC: 14 MMOL/L (ref 4–13)
BACTERIA UR CULT: NORMAL
BUN SERPL-MCNC: 37 MG/DL (ref 5–25)
CA-I BLD-SCNC: 1.07 MMOL/L (ref 1.12–1.32)
CALCIUM SERPL-MCNC: 8.4 MG/DL (ref 8.3–10.1)
CHLORIDE SERPL-SCNC: 103 MMOL/L (ref 100–108)
CO2 SERPL-SCNC: 22 MMOL/L (ref 21–32)
CREAT SERPL-MCNC: 1.78 MG/DL (ref 0.6–1.3)
ERYTHROCYTE [DISTWIDTH] IN BLOOD BY AUTOMATED COUNT: 13.6 % (ref 11.6–15.1)
GFR SERPL CREATININE-BSD FRML MDRD: 38 ML/MIN/1.73SQ M
GLUCOSE SERPL-MCNC: 137 MG/DL (ref 65–140)
GLUCOSE SERPL-MCNC: 187 MG/DL (ref 65–140)
GLUCOSE SERPL-MCNC: 188 MG/DL (ref 65–140)
GLUCOSE SERPL-MCNC: 271 MG/DL (ref 65–140)
GLUCOSE SERPL-MCNC: 280 MG/DL (ref 65–140)
HCT VFR BLD AUTO: 45 % (ref 36.5–49.3)
HGB BLD-MCNC: 14.4 G/DL (ref 12–17)
MAGNESIUM SERPL-MCNC: 2.4 MG/DL (ref 1.6–2.6)
MCH RBC QN AUTO: 30.3 PG (ref 26.8–34.3)
MCHC RBC AUTO-ENTMCNC: 32 G/DL (ref 31.4–37.4)
MCV RBC AUTO: 95 FL (ref 82–98)
PLATELET # BLD AUTO: 233 THOUSANDS/UL (ref 149–390)
PMV BLD AUTO: 10.5 FL (ref 8.9–12.7)
POTASSIUM SERPL-SCNC: 3.5 MMOL/L (ref 3.5–5.3)
RBC # BLD AUTO: 4.76 MILLION/UL (ref 3.88–5.62)
SODIUM SERPL-SCNC: 139 MMOL/L (ref 136–145)
WBC # BLD AUTO: 14.57 THOUSAND/UL (ref 4.31–10.16)

## 2020-02-10 PROCEDURE — 93306 TTE W/DOPPLER COMPLETE: CPT | Performed by: INTERNAL MEDICINE

## 2020-02-10 PROCEDURE — 82330 ASSAY OF CALCIUM: CPT | Performed by: NURSE PRACTITIONER

## 2020-02-10 PROCEDURE — 85027 COMPLETE CBC AUTOMATED: CPT | Performed by: NURSE PRACTITIONER

## 2020-02-10 PROCEDURE — 99233 SBSQ HOSP IP/OBS HIGH 50: CPT | Performed by: NURSE PRACTITIONER

## 2020-02-10 PROCEDURE — 82948 REAGENT STRIP/BLOOD GLUCOSE: CPT

## 2020-02-10 PROCEDURE — 93306 TTE W/DOPPLER COMPLETE: CPT

## 2020-02-10 PROCEDURE — 99232 SBSQ HOSP IP/OBS MODERATE 35: CPT | Performed by: INTERNAL MEDICINE

## 2020-02-10 PROCEDURE — 80048 BASIC METABOLIC PNL TOTAL CA: CPT | Performed by: NURSE PRACTITIONER

## 2020-02-10 PROCEDURE — 83735 ASSAY OF MAGNESIUM: CPT | Performed by: NURSE PRACTITIONER

## 2020-02-10 RX ORDER — OXYCODONE HYDROCHLORIDE AND ACETAMINOPHEN 5; 325 MG/1; MG/1
1 TABLET ORAL EVERY 4 HOURS PRN
Status: DISCONTINUED | OUTPATIENT
Start: 2020-02-10 | End: 2020-02-12 | Stop reason: HOSPADM

## 2020-02-10 RX ORDER — FLUTICASONE PROPIONATE 50 MCG
1 SPRAY, SUSPENSION (ML) NASAL DAILY
Status: DISCONTINUED | OUTPATIENT
Start: 2020-02-10 | End: 2020-02-12 | Stop reason: HOSPADM

## 2020-02-10 RX ORDER — LIDOCAINE 50 MG/G
1 PATCH TOPICAL ONCE
Status: COMPLETED | OUTPATIENT
Start: 2020-02-10 | End: 2020-02-11

## 2020-02-10 RX ORDER — OXYCODONE HYDROCHLORIDE AND ACETAMINOPHEN 5; 325 MG/1; MG/1
2 TABLET ORAL EVERY 4 HOURS PRN
Status: DISCONTINUED | OUTPATIENT
Start: 2020-02-10 | End: 2020-02-12 | Stop reason: HOSPADM

## 2020-02-10 RX ORDER — METOPROLOL SUCCINATE 50 MG/1
50 TABLET, EXTENDED RELEASE ORAL DAILY
Status: DISCONTINUED | OUTPATIENT
Start: 2020-02-10 | End: 2020-02-12 | Stop reason: HOSPADM

## 2020-02-10 RX ORDER — CALCIUM GLUCONATE 20 MG/ML
1 INJECTION, SOLUTION INTRAVENOUS ONCE
Status: COMPLETED | OUTPATIENT
Start: 2020-02-10 | End: 2020-02-10

## 2020-02-10 RX ORDER — POTASSIUM CHLORIDE 20 MEQ/1
60 TABLET, EXTENDED RELEASE ORAL ONCE
Status: COMPLETED | OUTPATIENT
Start: 2020-02-10 | End: 2020-02-10

## 2020-02-10 RX ORDER — AMIODARONE HYDROCHLORIDE 200 MG/1
200 TABLET ORAL
Status: DISCONTINUED | OUTPATIENT
Start: 2020-02-10 | End: 2020-02-12 | Stop reason: HOSPADM

## 2020-02-10 RX ADMIN — POTASSIUM CHLORIDE 60 MEQ: 1500 TABLET, EXTENDED RELEASE ORAL at 14:31

## 2020-02-10 RX ADMIN — CEFTRIAXONE 2000 MG: 2 INJECTION, POWDER, FOR SOLUTION INTRAMUSCULAR; INTRAVENOUS at 11:57

## 2020-02-10 RX ADMIN — INSULIN LISPRO 1 UNITS: 100 INJECTION, SOLUTION INTRAVENOUS; SUBCUTANEOUS at 07:52

## 2020-02-10 RX ADMIN — HEPARIN SODIUM 5000 UNITS: 5000 INJECTION INTRAVENOUS; SUBCUTANEOUS at 14:31

## 2020-02-10 RX ADMIN — HEPARIN SODIUM 5000 UNITS: 5000 INJECTION INTRAVENOUS; SUBCUTANEOUS at 05:09

## 2020-02-10 RX ADMIN — PERFLUTREN 0.8 ML/MIN: 6.52 INJECTION, SUSPENSION INTRAVENOUS at 10:51

## 2020-02-10 RX ADMIN — AMIODARONE HYDROCHLORIDE 200 MG: 200 TABLET ORAL at 11:56

## 2020-02-10 RX ADMIN — METOPROLOL SUCCINATE 50 MG: 50 TABLET, EXTENDED RELEASE ORAL at 11:56

## 2020-02-10 RX ADMIN — HEPARIN SODIUM 5000 UNITS: 5000 INJECTION INTRAVENOUS; SUBCUTANEOUS at 21:22

## 2020-02-10 RX ADMIN — ACETAMINOPHEN 975 MG: 325 TABLET ORAL at 05:09

## 2020-02-10 RX ADMIN — FLUTICASONE PROPIONATE 1 SPRAY: 50 SPRAY, METERED NASAL at 16:30

## 2020-02-10 RX ADMIN — AMIODARONE HYDROCHLORIDE 200 MG: 200 TABLET ORAL at 16:44

## 2020-02-10 RX ADMIN — MELATONIN TAB 3 MG 6 MG: 3 TAB at 21:22

## 2020-02-10 RX ADMIN — LIDOCAINE 1 PATCH: 50 PATCH TOPICAL at 14:32

## 2020-02-10 RX ADMIN — CALCIUM GLUCONATE 1 G: 20 INJECTION, SOLUTION INTRAVENOUS at 14:32

## 2020-02-10 RX ADMIN — INSULIN LISPRO 2 UNITS: 100 INJECTION, SOLUTION INTRAVENOUS; SUBCUTANEOUS at 21:22

## 2020-02-10 RX ADMIN — INSULIN LISPRO 4 UNITS: 100 INJECTION, SOLUTION INTRAVENOUS; SUBCUTANEOUS at 16:48

## 2020-02-10 NOTE — PLAN OF CARE
Problem: Potential for Falls  Goal: Patient will remain free of falls  Description  INTERVENTIONS:  - Assess patient frequently for physical needs  -  Identify cognitive and physical deficits and behaviors that affect risk of falls    -  [place] fall precautions as indicated by assessment   - Educate patient/family on patient safety including physical limitations  - Instruct patient to call for assistance with activity based on assessment  - Modify environment to reduce risk of injury  - Consider OT/PT consult to assist with strengthening/mobility  Outcome: Progressing     Problem: PAIN - ADULT  Goal: Verbalizes/displays adequate comfort level or baseline comfort level  Description  Interventions:  - Encourage patient to monitor pain and request assistance  - Assess pain using appropriate pain scale  - Administer analgesics based on type and severity of pain and evaluate response  - Implement non-pharmacological measures as appropriate and evaluate response  - Consider cultural and social influences on pain and pain management  - Notify physician/advanced practitioner if interventions unsuccessful or patient reports new pain  Outcome: Progressing     Problem: INFECTION - ADULT  Goal: Absence or prevention of progression during hospitalization  Description  INTERVENTIONS:  - Assess and monitor for signs and symptoms of infection  - Monitor lab/diagnostic results  - Monitor all insertion sites, i e  indwelling lines, tubes, and drains  - Monitor endotracheal if appropriate and nasal secretions for changes in amount and color  - [place] appropriate cooling/warming therapies per order  - Administer medications as ordered  - Instruct and encourage patient and family to use good hand hygiene technique  - Identify and instruct in appropriate isolation precautions for identified infection/condition  Outcome: Progressing  Goal: Absence of fever/infection during neutropenic period  Description  INTERVENTIONS:  - Monitor WBC    Outcome: Progressing     Problem: SAFETY ADULT  Goal: Maintain or return to baseline ADL function  Description  INTERVENTIONS:  -  Assess patient's ability to carry out ADLs; assess patient's baseline for ADL function and identify physical deficits which impact ability to perform ADLs (bathing, care of mouth/teeth, toileting, grooming, dressing, etc )  - Assess/evaluate cause of self-care deficits   - Assess range of motion  - Assess patient's mobility; develop plan if impaired  - Assess patient's need for assistive devices and provide as appropriate  - Encourage maximum independence but intervene and supervise when necessary  - Involve family in performance of ADLs  - Assess for home care needs following discharge   - Consider OT consult to assist with ADL evaluation and planning for discharge  - Provide patient education as appropriate  Outcome: Progressing  Goal: Maintain or return mobility status to optimal level  Description  INTERVENTIONS:  - Assess patient's baseline mobility status (ambulation, transfers, stairs, etc )    - Identify cognitive and physical deficits and behaviors that affect mobility  - Identify mobility aids required to assist with transfers and/or ambulation (gait belt, sit-to-stand, lift, walker, cane, etc )  - [place] fall precautions as indicated by assessment  - Record patient progress and toleration of activity level on Mobility SBAR; progress patient to next Phase/Stage  - Instruct patient to call for assistance with activity based on assessment  - Consider rehabilitation consult to assist with strengthening/weightbearing, etc   Outcome: Progressing     Problem: DISCHARGE PLANNING  Goal: Discharge to home or other facility with appropriate resources  Description  INTERVENTIONS:  - Identify barriers to discharge w/patient and caregiver  - Arrange for needed discharge resources and transportation as appropriate  - Identify discharge learning needs (meds, wound care, etc )  - Arrange for interpretive services to assist at discharge as needed  - Refer to Case Management Department for coordinating discharge planning if the patient needs post-hospital services based on physician/advanced practitioner order or complex needs related to functional status, cognitive ability, or social support system  Outcome: Progressing     Problem: Knowledge Deficit  Goal: Patient/family/caregiver demonstrates understanding of disease process, treatment plan, medications, and discharge instructions  Description  Complete learning assessment and assess knowledge base    Interventions:  - Provide teaching at level of understanding  - Provide teaching via preferred learning methods  Outcome: Progressing     Problem: CARDIOVASCULAR - ADULT  Goal: Maintains optimal cardiac output and hemodynamic stability  Description  INTERVENTIONS:  - Monitor I/O, vital signs and rhythm  - Monitor for S/S and trends of decreased cardiac output  - Administer and titrate ordered vasoactive medications to optimize hemodynamic stability  - Assess quality of pulses, skin color and temperature  - Assess for signs of decreased coronary artery perfusion  - Instruct patient to report change in severity of symptoms  Outcome: Progressing  Goal: Absence of cardiac dysrhythmias or at baseline rhythm  Description  INTERVENTIONS:  - Continuous cardiac monitoring, vital signs, obtain 12 lead EKG if ordered  - Administer antiarrhythmic and heart rate control medications as ordered  - Monitor electrolytes and administer replacement therapy as ordered  Outcome: Progressing     Problem: GENITOURINARY - ADULT  Goal: Maintains or returns to baseline urinary function  Description  INTERVENTIONS:  - Assess urinary function  - Encourage oral fluids to ensure adequate hydration if ordered  - Administer IV fluids as ordered to ensure adequate hydration  - Administer ordered medications as needed  - Offer frequent toileting  - Follow urinary retention protocol if ordered  Outcome: Progressing  Goal: Absence of urinary retention  Description  INTERVENTIONS:  - Assess patients ability to void and empty bladder  - Monitor I/O  - Bladder scan as needed  - Discuss with physician/AP medications to alleviate retention as needed  - Discuss catheterization for long term situations as appropriate  Outcome: Progressing  Goal: Urinary catheter remains patent  Description  INTERVENTIONS:  - Assess patency of urinary catheter  - If patient has a chronic wilhelm, consider changing catheter if non-functioning  - Follow guidelines for intermittent irrigation of non-functioning urinary catheter  Outcome: Progressing     Problem: METABOLIC, FLUID AND ELECTROLYTES - ADULT  Goal: Electrolytes maintained within normal limits  Description  INTERVENTIONS:  - Monitor labs and assess patient for signs and symptoms of electrolyte imbalances  - Administer electrolyte replacement as ordered  - Monitor response to electrolyte replacements, including repeat lab results as appropriate  - Instruct patient on fluid and nutrition as appropriate  Outcome: Progressing  Goal: Fluid balance maintained  Description  INTERVENTIONS:  - Monitor labs   - Monitor I/O and WT  - Instruct patient on fluid and nutrition as appropriate  - Assess for signs & symptoms of volume excess or deficit  Outcome: Progressing  Goal: Glucose maintained within target range  Description  INTERVENTIONS:  - Monitor Blood Glucose as ordered  - Assess for signs and symptoms of hyperglycemia and hypoglycemia  - Administer ordered medications to maintain glucose within target range  - Assess nutritional intake and initiate nutrition service referral as needed  Outcome: Progressing

## 2020-02-10 NOTE — ASSESSMENT & PLAN NOTE
· S/P stent placement on 2/6/20  · CT scan revealed air and stranding in left renal pelvis which could be related to recent stent, stent appears to have migrated  · Urology assistance is appreciated- no intervention required at this time

## 2020-02-10 NOTE — PROGRESS NOTES
Progress Note Damian Fneton [birth year], 71 y o  male MRN: [id number]    Unit/Bed#: ICU 14 Encounter: [id number]    Primary Care Provider: Rito Herbert MD   Date and time admitted to hospital: 2/8/2020 11:14 PM        Cardiac arrest with successful resuscitation [facility])  Assessment & Plan  · Patient developed Vtach; on initial assessment he was awake and alert, however he became unresponsive during attempt at intervention and CPR, ACLS protocol immediately started   He had one round of CPR with defibrillation and achieved ROSC  · He suddenly woke up following the code and is awake, alert, interactive  · He remains on an amiodarone infusion- may be able to transition to PO amio soon  · Currently in NSR  · Obtain echo  · Appreciate cardiology's assessment  · Monitor closely    * Septic shock ([facility] [address] )  Assessment & Plan  · Secondary to UTI from cysto with stent placement on 2/6  · Admitted with complaints of N/V, anorexia, syncope  · Sepsis protocol initiated in ED  · Received cefepime, 4L IVF, placed on levophed infusion for persistent hypotension  · Blood cultures, urine culture sent  · Follow cultures, wbc, temps, procalcitonin        DAVINA (acute kidney injury) ([facility] [address] )  Assessment & Plan  · Creatinine on 1/28/20 was 1 33, high 2 2- now down trending  · Will monitor renal indices and urine output  · Avoid nephrotoxins    UTI (urinary tract infection)  Assessment & Plan  · +U/A for leukocytes, nitrites, ketones, blood  · Initiate ceftriaxone      Kidney stone  Assessment & Plan  · S/P stent placement on 2/6/20  · CT scan revealed air and stranding in left renal pelvis which could be related to recent stent, stent appears to have migrated  · Urology assistance is appreciated- no intervention required at this time    BPH without obstruction/lower urinary tract symptoms  Assessment & Plan  · Urinary retention protocol  · Takes flomax as outpatient, will hold until BP stable    ----------------------------------------------------------------------------------------  HPI/24hr events: No events overnight, Feels improved    Disposition: Transfer to Med Surg with Telemetry   Code Status: Level 1 - Full Code  ---------------------------------------------------------------------------------------  SUBJECTIVE  Complains of some musculoskeletal CP or SOB    Review of Systems  Review of systems was reviewed and negative unless stated above in HPI/24-hour events   ---------------------------------------------------------------------------------------  OBJECTIVE    Vitals   Vitals:    02/10/20 0200 02/10/20 0300 02/10/20 0400 02/10/20 0500   BP: 110/75 112/78 116/77    Pulse: 72 70 70 74   Resp: 22 (!) 23 (!) 26 (!) 34   Temp:       TempSrc:       SpO2: 91% (!) 88% (!) 89% (!) 85%   Weight:       Height:         Temp (24hrs), Av 5 °F (36 9 °C), Min:98 1 °F (36 7 °C), Max:99 1 °F (37 3 °C)  Current: Temperature: 98 5 °F (36 9 °C)        SpO2 Device: O2 Device: Nasal cannula  O2 Flow Rate (L/min): 2 L/min    Physical Exam   Constitutional: He is oriented to person, place, and time  He appears well-developed and well-nourished  HENT:   Head: Normocephalic and atraumatic  Eyes: Pupils are equal, round, and reactive to light  Neck: Neck supple  Cardiovascular: Normal rate and regular rhythm  Pulmonary/Chest: Effort normal and breath sounds normal  No respiratory distress  Abdominal: Soft  Bowel sounds are normal  He exhibits no distension  Musculoskeletal: He exhibits no edema  Lymphadenopathy:     He has no cervical adenopathy  Neurological: He is alert and oriented to person, place, and time  No cranial nerve deficit  Skin: Skin is warm and dry  Psychiatric: He has a normal mood and affect         Invasive/non-invasive ventilation settings   Respiratory    Lab Data (Last 4 hours)    None         O2/Vent Data (Last 4 hours)    None                Laboratory and Diagnostics:  Results from last 7 days   Lab Units 02/10/20  0500 02/09/20  0556 02/08/20  2354   WBC Thousand/uL 14 57* 19 28* 20 07*   HEMOGLOBIN g/dL 14 4 12 8 13 7   HEMATOCRIT % 45 0 40 3 43 3   PLATELETS Thousands/uL 233 205 194   NEUTROS PCT %  --  86* 83*   MONOS PCT %  --  9 11     Results from last 7 days   Lab Units 02/10/20  0500 02/09/20  0556 02/08/20  2354   SODIUM mmol/L 139 138 135*   POTASSIUM mmol/L 3 5 4 0 5 2   CHLORIDE mmol/L 103 103 101   CO2 mmol/L 22 24 21   ANION GAP mmol/L 14* 11 13   BUN mg/dL 37* 39* 39*   CREATININE mg/dL 1 78* 2 11* 2 22*   CALCIUM mg/dL 8 4 8 0* 8 1*   GLUCOSE RANDOM mg/dL 187* 244* 232*   ALT U/L  --   --  32   AST U/L  --   --  58*   ALK PHOS U/L  --   --  64   ALBUMIN g/dL  --   --  2 7*   TOTAL BILIRUBIN mg/dL  --   --  1 48*     Results from last 7 days   Lab Units 02/10/20  0500 02/09/20  0556   MAGNESIUM mg/dL 2 4 1 9   PHOSPHORUS mg/dL  --  3 8      Results from last 7 days   Lab Units 02/09/20  0023   INR  1 37*   PTT seconds 28      Results from last 7 days   Lab Units 02/09/20  0906 02/09/20  0556 02/09/20  0344 02/09/20  0031   TROPONIN I ng/mL 0 53* 0 34* 0 17* 0 06*     Results from last 7 days   Lab Units 02/09/20  0203 02/08/20  2354   LACTIC ACID mmol/L 2 2* 3 0*     ABG:    VBG:    Results from last 7 days   Lab Units 02/09/20  0556 02/09/20  0344 02/08/20  2354   PROCALCITONIN ng/ml 20 64* 21 34* 26 20*       Micro  Results from last 7 days   Lab Units 02/08/20  2355 02/08/20  2354   BLOOD CULTURE  Received in Microbiology Lab  Culture in Progress  Received in Microbiology Lab  Culture in Progress  EKG:   Imaging: I have personally reviewed pertinent reports     and I have personally reviewed pertinent films in PACS    Intake and Output  I/O       02/08 0701 - 02/09 0700 02/09 0701 - 02/10 0700    I V  (mL/kg) 280 7 (2 6) 1811 2 (16 9)    IV Piggyback 4150     Total Intake(mL/kg) 4430 7 (41 4) 1811 2 (16 9)    Urine (mL/kg/hr)  775 (0 3) Total Output  775    Net +4430 7 +1036 2                Height and Weights   Height: 6' 1" (185 4 cm)  IBW: 79 9 kg  Body mass index is 31 06 kg/m²  Weight (last 2 days)     Date/Time   Weight    02/09/20 0330   107 (235 45)    02/08/20 2318   100 (221 34)                Nutrition       Diet Orders   (From admission, onward)             Start     Ordered    02/09/20 1107  Diet Regular; Regular House  Diet effective now     Question Answer Comment   Diet Type Regular    Regular Regular House    RD to adjust diet per protocol?  Yes        02/09/20 1108                  Active Medications  Scheduled Meds:    Current Facility-Administered Medications:  acetaminophen 975 mg Oral Q8H PRN Adolfo Sims MD    amiodarone 0 5 mg/min Intravenous Continuous DMITRY Biggs Last Rate: 0 5 mg/min (02/09/20 0813)   cefTRIAXone 2,000 mg Intravenous Q24H DMITRY Chang Last Rate: 2,000 mg (02/09/20 1150)   heparin (porcine) 5,000 Units Subcutaneous [facility] DMITRY Chang    insulin lispro 1-5 Units Subcutaneous HS Theora Eliceo, LIZANDRONP    insulin lispro 1-6 Units Subcutaneous TID AC DMITRY Nolan    lactated ringers 125 mL/hr Intravenous Continuous DMITRY Chang Last Rate: 125 mL/hr (02/09/20 1149)   melatonin 6 mg Oral HS Cailin Fenton, LIZANDRONP    norepinephrine 1-30 mcg/min Intravenous Titrated DMITRY Chang Last Rate: Stopped (02/09/20 1548)     Continuous Infusions:    amiodarone 0 5 mg/min Last Rate: 0 5 mg/min (02/09/20 0813)   lactated ringers 125 mL/hr Last Rate: 125 mL/hr (02/09/20 1149)   norepinephrine 1-30 mcg/min Last Rate: Stopped (02/09/20 1548)     PRN Meds:     acetaminophen 975 mg Q8H PRN     ---------------------------------------------------------------------------------------  Advance Directive and Living Will:      Power of :    POLST:    ---------------------------------------------------------------------------------------  Counseling / Coordination of Rich Conroy 16, CRNP      Portions of the record may have been created with voice recognition software  Occasional wrong word or "sound a like" substitutions may have occurred due to the inherent limitations of voice recognition software    Read the chart carefully and recognize, using context, where substitutions have occurred

## 2020-02-10 NOTE — ASSESSMENT & PLAN NOTE
· Secondary to UTI from cysto with stent placement on 2/6  · Admitted with complaints of N/V, anorexia, syncope  · Sepsis protocol initiated in ED  · Received cefepime, 4L IVF, placed on levophed infusion for persistent hypotension-improved  · Blood cultures, urine culture sent  · Follow cultures, wbc, temps, procalcitonin

## 2020-02-10 NOTE — ASSESSMENT & PLAN NOTE
· Creatinine on 1/28/20 was 1 33, now 1 7 which is improved from 2 2  · Will monitor renal indices and urine output  · Avoid nephrotoxins

## 2020-02-10 NOTE — UTILIZATION REVIEW
Initial Clinical Review    Admission: Date/Time/Statement: Admission Orders (From admission, onward)     Ordered        02/09/20 0204  Inpatient Admission  Once                   Orders Placed This Encounter   Procedures    Inpatient Admission     Standing Status:   Standing     Number of Occurrences:   1     Order Specific Question:   Admitting Physician     Answer:   Marcy Schneider     Order Specific Question:   Level of Care     Answer:   Critical Care [15]     Order Specific Question:   Estimated length of stay     Answer:   More than 2 Midnights     Order Specific Question:   Certification     Answer:   I certify that inpatient services are medically necessary for this patient for a duration of greater than two midnights  See H&P and MD Progress Notes for additional information about the patient's course of treatment  ED Arrival Information     Expected Arrival Acuity Means of Arrival Escorted By Service Admission Type    - 2/8/2020 23:14 [facility] Critical Care/ICU Emergency    Arrival Complaint    dizzy        Chief Complaint   Patient presents with    Vomiting     Pt reports vomiting started this afternoon  Pt reports he has been having the chills  Pt denies abdominal pain and urinary symptoms  Pt has cysto on 2/6/20  Pt hypotensive on arrival       Assessment/Plan:    71 Y O male presents to ED  Via  EMS  from home with chills, nausea, vomiting and urinary frequency for past 2 days  Had a  Syncopal episode  At home which prompted EMS  Call  Patient had a left ureteral stent placed on 2/6 for a kidney stone and generally not been feeling well since the procedure   Shortly after arrival to ED patient developed rapid afib in the 180s with hypotension, followed by Vtach  He was initially responsive during the vtach but became unresponsive as witnessed by staff and immediately resuscitated   ROSC was achieved after one round of CPR with defibrillation  Patient  Given an amiodarone bolus, developed  Bradycardia and apnea,  On  Preparation for  Intubation, patient  Woke  With full neurologic response  BP  Remained  Low  Despite IVF and  Levophed infusion added  L;abs in ED  Revealed  WBC  Of  20, lactic acid  Of  3 and U/a  Showing  Acute kidney injury  No acute proccess noted on   Ct  Chest/abdomen  Admit  Ip ICU LOC  With   Cardiac  Arrest  With successful resuscitation, septic shock,  Acute kidney injury and UTI and plan is  Cardiology consult,  2  DE, monitor labs,  EKG's  And  MATEUS  Per cardiology  Consult:     S/P  Cardiac arrest  With  Successful resuscitation, septic  Shock  Amiodarone  Drip  D/c   2/10  @  1156  Tele  Showing  Atrial tachycardia, nonsustained  And PVC's     Atrial fib and   V Tach likely  secondary to   Septic shock  LACIE ordered  Likely  Needs stress test  And beta blocker  Once stable        ED Triage Vitals [02/08/20 2318]   Temperature Pulse Respirations Blood Pressure SpO2   97 7 °F (36 5 °C) 75 18 (!) 87/63 97 %      Temp Source Heart Rate Source Patient Position - Orthostatic VS BP Location FiO2 (%)   Oral Monitor Lying Right arm --      Pain Score       No Pain        Wt Readings from Last 1 Encounters:   02/09/20 107 kg (235 lb 7 2 oz)     Additional Vital Signs:   20 0500    78  29Abnormal   110/72  83  91 %       02/09/20 0400    78  25Abnormal   117/80  96  93 %       02/09/20 0345    80  25Abnormal       93 %  Nasal cannula     02/09/20 0330    78  24Abnormal   110/77  89  94 %       02/09/20 0315  99 °F (37 2 °C)  78  29Abnormal   122/75  100  96 %       02/09/20 0253    78  18  107/79    98 %  Nasal cannula  Lying   02/09/20 0235    84  20  111/69    98 %  Nasal cannula  Lying   02/09/20 0224    84  22  109/77        Lying   02/09/20 0216    78  20  114/74    97 %  Nasal cannula  Lying   02/09/20 0204    77  22  103/71    96 %  Nasal cannula  Lying   02/09/20 0142    80 22  101/60    98 %  Nasal cannula  Lying   02/09/20 0108    167Abnormal     74/52Abnormal     98 %  Nasal cannula  Lying   02/09/20 0057    166Abnormal   22  78/61Abnormal     98 %  Nasal cannula  Lying   02/09/20 0050    165Abnormal   24Abnormal   81/69Abnormal     98 %  Nasal cannula  Sitting   02/09/20 0038    162Abnormal   22  101/59    99 %  Nasal cannula  Lying   02/09/20 0031    156Abnormal   24Abnormal   64/56Abnormal     100 %  Nasal cannula  Lying   02/09/20 0022        59/48Abnormal            02/09/20 0012        78/56Abnormal            02/09/20 0006    165Abnormal   22  80/50Abnormal      97 %  Nasal cannula  Lying   BP: provider made aware at 02/09/20 0006   02/08/20 2356    174Abnormal   24Abnormal   92/43Abnormal     96 %  Nasal cannula  Lying   02/08/20 23:50:13    167Abnormal     155/107Abnormal     98 %       02/08/20 23:47:22    162Abnormal                02/08/20 23:46:05    43Abnormal                02/08/20 2335    72  26Abnormal   108/75    93 %  None (Room air)  Lying   02/08/20 2318  97 7 °F (36 5 °C)  75  18  87/63Abnormal     97 %  None (Room air)  Lying         Pertinent Labs/Diagnostic Test Results:   EKG   SR  With  PVC's  Ct  Abd/pelvis  ( 2/9)     No evidence of pulmonary embolus    Malposition left-sided ureteral stent with its tip in the distal ureter   Proximal portion of the stent coiled within the renal pelvis  Mild left-sided perinephric stranding   Clinical correlation for infection  CXR  ( 2/9)     Shallow inspiratory effort mild accentuation of the cardiomediastinal silhouette  Subsegmental atelectasis at the left base    Results from last 7 days   Lab Units 02/10/20  0500 02/09/20  0556 02/08/20  2354   WBC Thousand/uL 14 57* 19 28* 20 07*   HEMOGLOBIN g/dL 14 4 12 8 13 7   HEMATOCRIT % 45 0 40 3 43 3   PLATELETS Thousands/uL 233 205 194   NEUTROS ABS Thousands/µL  --  16 47* 16 54*         Results from last 7 days Lab Units 02/10/20  0500 02/09/20  0556 02/08/20  2354   SODIUM mmol/L 139 138 135*   POTASSIUM mmol/L 3 5 4 0 5 2   CHLORIDE mmol/L 103 103 101   CO2 mmol/L 22 24 21   ANION GAP mmol/L 14* 11 13   BUN mg/dL 37* 39* 39*   CREATININE mg/dL 1 78* 2 11* 2 22*   EGFR ml/min/1 73sq m 38 31 29   CALCIUM mg/dL 8 4 8 0* 8 1*   CALCIUM, IONIZED mmol/L 1 07*  --   --    MAGNESIUM mg/dL 2 4 1 9  --    PHOSPHORUS mg/dL  --  3 8  --      Results from last 7 days   Lab Units 02/08/20  2354   AST U/L 58*   ALT U/L 32   ALK PHOS U/L 64   TOTAL PROTEIN g/dL 6 6   ALBUMIN g/dL 2 7*   TOTAL BILIRUBIN mg/dL 1 48*     Results from last 7 days   Lab Units 02/10/20  1108 02/10/20  0706 02/09/20  2053 02/09/20  1743 02/09/20  1206 02/09/20  0601 02/06/20  1318   POC GLUCOSE mg/dl 137 188* 259* 239* 177* 224* 153*     Results from last 7 days   Lab Units 02/10/20  0500 02/09/20  0556 02/08/20  2354   GLUCOSE RANDOM mg/dL 187* 244* 232*           Results from last 7 days   Lab Units 02/09/20  0906 02/09/20  0556 02/09/20  0344 02/09/20  0031   TROPONIN I ng/mL 0 53* 0 34* 0 17* 0 06*         Results from last 7 days   Lab Units 02/09/20  0023   PROTIME seconds 17 1*   INR  1 37*   PTT seconds 28         Results from last 7 days   Lab Units 02/09/20  0556 02/09/20  0344 02/08/20  2354   PROCALCITONIN ng/ml 20 64* 21 34* 26 20*     Results from last 7 days   Lab Units 02/09/20  0203 02/08/20  2354   LACTIC ACID mmol/L 2 2* 3 0*               Results from last 7 days   Lab Units 02/09/20  0242   CLARITY UA  Cloudy   COLOR UA  Nancy   SPEC GRAV UA  1 020   PH UA  5 5   GLUCOSE UA mg/dl Negative   KETONES UA mg/dl Trace*   BLOOD UA  Large*   PROTEIN UA mg/dl >=300*   NITRITE UA  Positive*   BILIRUBIN UA  Interference- unable to analyze*   UROBILINOGEN UA E U /dl 0 2   LEUKOCYTES UA  Small*   WBC UA /hpf Innumerable*   RBC UA /hpf Innumerable*   BACTERIA UA /hpf Moderate*   EPITHELIAL CELLS WET PREP /hpf Occasional           Results from last 7 days   Lab Units 02/09/20  0242 02/08/20  2355 02/08/20  2354   BLOOD CULTURE   --  No Growth at 24 hrs  No Growth at 24 hrs  URINE CULTURE  <10,000 cfu/ml   --   --                ED Treatment:   Medication Administration from 02/08/2020 2314 to 02/09/2020 0304       Date/Time Order Dose Route Action Comments     02/08/2020 2320 ondansetron (FOR EMS ONLY) (ZOFRAN) 4 mg/2 mL injection 4 mg 0 mg Does not apply Given to EMS      02/09/2020 0022 sodium chloride 0 9 % bolus 1,000 mL 0 mL Intravenous Stopped      02/08/2020 2353 sodium chloride 0 9 % bolus 1,000 mL 1,000 mL Intravenous New Bag      02/09/2020 0008 ondansetron (ZOFRAN) injection 4 mg 0 mg Intravenous Hold      02/09/2020 0010 amiodarone 150 mg in dextrose 5 % 100 mL IV bolus 0 mg Intravenous Stopped      02/08/2020 2358 amiodarone 150 mg in dextrose 5 % 100 mL IV bolus 150 mg Intravenous New Bag      02/09/2020 0025 amiodarone (CORDARONE) 900 mg in dextrose 5 % 500 mL infusion 0 mg/min Intravenous Hold Hold per provider       02/09/2020 0057 cefepime (MAXIPIME) 2 g/50 mL dextrose IVPB 0 mg Intravenous Stopped      02/09/2020 0034 cefepime (MAXIPIME) 2 g/50 mL dextrose IVPB 2,000 mg Intravenous New Bag      02/09/2020 0043 sodium chloride 0 9 % bolus 1,000 mL 0 mL Intravenous Stopped      02/09/2020 0026 sodium chloride 0 9 % bolus 1,000 mL 1,000 mL Intravenous New Bag      02/09/2020 0057 sodium chloride 0 9 % bolus 1,000 mL 0 mL Intravenous Stopped      02/09/2020 0024 sodium chloride 0 9 % bolus 1,000 mL 1,000 mL Intravenous New Bag      02/09/2020 0216 norepinephrine (LEVOPHED) 4 mg (STANDARD CONCENTRATION) IV in sodium chloride 0 9% 250 mL 9 mcg/min Intravenous Rate/Dose Change      02/09/2020 0112 norepinephrine (LEVOPHED) 4 mg (STANDARD CONCENTRATION) IV in sodium chloride 0 9% 250 mL 10 mcg/min Intravenous Rate/Dose Change per provider     02/09/2020 0111 norepinephrine (LEVOPHED) 4 mg (STANDARD CONCENTRATION) IV in sodium chloride 0 9% 250 mL 0 mcg/min Intravenous Stopped      02/09/2020 0053 norepinephrine (LEVOPHED) 4 mg (STANDARD CONCENTRATION) IV in sodium chloride 0 9% 250 mL 6 mcg/min Intravenous Rate/Dose Change      02/09/2020 0032 norepinephrine (LEVOPHED) 4 mg (STANDARD CONCENTRATION) IV in sodium chloride 0 9% 250 mL 5 mcg/min Intravenous New Bag      02/09/2020 0033 norepinephrine (LEVOPHED) 1 mg/mL injection **ADS Override Pull**    Given      02/09/2020 0117 sodium chloride 0 9 % bolus 1,000 mL 1,000 mL Intravenous New Bag      02/09/2020 0143 iohexol (OMNIPAQUE) 350 MG/ML injection (MULTI-DOSE) 100 mL 100 mL Intravenous Given      02/08/2020 2346 atropine injection 1 mg 1 mg Intravenous Given given in emergency        Present on Admission:   Septic shock ([facility] )   UTI (urinary tract infection)   BPH without obstruction/lower urinary tract symptoms   Cardiac arrest with successful resuscitation ([facility] )   Kidney stone      Admitting Diagnosis: Cardiac arrest ([facility] ) [I46 9]  Ventricular tachycardia ([facility] ) [I47 2]  Vomiting [R11 10]  Acute kidney injury ([facility] ) [N17 9]  Septic shock ([facility] ) [A41 9, R65 21]  Age/Sex: 71 y o  male  Admission Orders:  Scheduled Medications:    Medications:  amiodarone 200 mg Oral TID With Meals   cefTRIAXone 2,000 mg Intravenous Q24H   heparin (porcine) 5,000 Units Subcutaneous Q8H [address]   insulin lispro 1-5 Units Subcutaneous HS   insulin lispro 1-6 Units Subcutaneous TID AC   melatonin 6 mg Oral HS   metoprolol succinate 50 mg Oral Daily     Continuous IV Infusions:    lactated ringers 125 mL/hr Intravenous Continuous   norepinephrine 1-30 mcg/min Intravenous Titrated   Amiodarone  Drip  D/c    2/10  @   1156  PRN Meds:    acetaminophen 975 mg Oral Q8H PRN       IP CONSULT TO CASE MANAGEMENT  IP CONSULT TO CARDIOLOGY  IP CONSULT TO UROLOGY    Network Utilization Review Department  [email or web address]  ATTENTION: Please call with any questions or concerns to [phone] and carefully listen to the prompts so that you are directed to the right person  All voicemails are confidential   Isabela Davey all requests for admission clinical reviews, approved or denied determinations and any other requests to dedicated fax number below belonging to the campus where the patient is receiving treatment   List of dedicated fax numbers for the Facilities:  [address] DENIALS (Administrative/Medical Necessity) [phone]   1000  [facility] (Maternity/NICU/Pediatrics) [phone]   [place]andria Cortés [phone]   [facility] [phone]   [place]rid[place] Silver [phone]   [address]  [phone]   1205 [facility] [address] [place] [address] [phone]   [facility]  [phone]   [address] [facility] [address] [facility]  2401 [facility] [address] [facility] [phone]

## 2020-02-10 NOTE — PLAN OF CARE
Problem: Potential for Falls  Goal: Patient will remain free of falls  Description  INTERVENTIONS:  - Assess patient frequently for physical needs  -  Identify cognitive and physical deficits and behaviors that affect risk of falls    -  [place] fall precautions as indicated by assessment   - Educate patient/family on patient safety including physical limitations  - Instruct patient to call for assistance with activity based on assessment  - Modify environment to reduce risk of injury  - Consider OT/PT consult to assist with strengthening/mobility  Outcome: Progressing     Problem: PAIN - ADULT  Goal: Verbalizes/displays adequate comfort level or baseline comfort level  Description  Interventions:  - Encourage patient to monitor pain and request assistance  - Assess pain using appropriate pain scale  - Administer analgesics based on type and severity of pain and evaluate response  - Implement non-pharmacological measures as appropriate and evaluate response  - Consider cultural and social influences on pain and pain management  - Notify physician/advanced practitioner if interventions unsuccessful or patient reports new pain  Outcome: Progressing     Problem: INFECTION - ADULT  Goal: Absence or prevention of progression during hospitalization  Description  INTERVENTIONS:  - Assess and monitor for signs and symptoms of infection  - Monitor lab/diagnostic results  - Monitor all insertion sites, i e  indwelling lines, tubes, and drains  - Monitor endotracheal if appropriate and nasal secretions for changes in amount and color  - [place] appropriate cooling/warming therapies per order  - Administer medications as ordered  - Instruct and encourage patient and family to use good hand hygiene technique  - Identify and instruct in appropriate isolation precautions for identified infection/condition  Outcome: Progressing  Goal: Absence of fever/infection during neutropenic period  Description  INTERVENTIONS:  - Monitor WBC    Outcome: Progressing     Problem: SAFETY ADULT  Goal: Maintain or return to baseline ADL function  Description  INTERVENTIONS:  -  Assess patient's ability to carry out ADLs; assess patient's baseline for ADL function and identify physical deficits which impact ability to perform ADLs (bathing, care of mouth/teeth, toileting, grooming, dressing, etc )  - Assess/evaluate cause of self-care deficits   - Assess range of motion  - Assess patient's mobility; develop plan if impaired  - Assess patient's need for assistive devices and provide as appropriate  - Encourage maximum independence but intervene and supervise when necessary  - Involve family in performance of ADLs  - Assess for home care needs following discharge   - Consider OT consult to assist with ADL evaluation and planning for discharge  - Provide patient education as appropriate  Outcome: Progressing  Goal: Maintain or return mobility status to optimal level  Description  INTERVENTIONS:  - Assess patient's baseline mobility status (ambulation, transfers, stairs, etc )    - Identify cognitive and physical deficits and behaviors that affect mobility  - Identify mobility aids required to assist with transfers and/or ambulation (gait belt, sit-to-stand, lift, walker, cane, etc )  - [place] fall precautions as indicated by assessment  - Record patient progress and toleration of activity level on Mobility SBAR; progress patient to next Phase/Stage  - Instruct patient to call for assistance with activity based on assessment  - Consider rehabilitation consult to assist with strengthening/weightbearing, etc   Outcome: Progressing     Problem: DISCHARGE PLANNING  Goal: Discharge to home or other facility with appropriate resources  Description  INTERVENTIONS:  - Identify barriers to discharge w/patient and caregiver  - Arrange for needed discharge resources and transportation as appropriate  - Identify discharge learning needs (meds, wound care, etc )  - Arrange for interpretive services to assist at discharge as needed  - Refer to Case Management Department for coordinating discharge planning if the patient needs post-hospital services based on physician/advanced practitioner order or complex needs related to functional status, cognitive ability, or social support system  Outcome: Progressing     Problem: Knowledge Deficit  Goal: Patient/family/caregiver demonstrates understanding of disease process, treatment plan, medications, and discharge instructions  Description  Complete learning assessment and assess knowledge base    Interventions:  - Provide teaching at level of understanding  - Provide teaching via preferred learning methods  Outcome: Progressing     Problem: CARDIOVASCULAR - ADULT  Goal: Maintains optimal cardiac output and hemodynamic stability  Description  INTERVENTIONS:  - Monitor I/O, vital signs and rhythm  - Monitor for S/S and trends of decreased cardiac output  - Administer and titrate ordered vasoactive medications to optimize hemodynamic stability  - Assess quality of pulses, skin color and temperature  - Assess for signs of decreased coronary artery perfusion  - Instruct patient to report change in severity of symptoms  Outcome: Progressing  Goal: Absence of cardiac dysrhythmias or at baseline rhythm  Description  INTERVENTIONS:  - Continuous cardiac monitoring, vital signs, obtain 12 lead EKG if ordered  - Administer antiarrhythmic and heart rate control medications as ordered  - Monitor electrolytes and administer replacement therapy as ordered  Outcome: Progressing     Problem: GENITOURINARY - ADULT  Goal: Maintains or returns to baseline urinary function  Description  INTERVENTIONS:  - Assess urinary function  - Encourage oral fluids to ensure adequate hydration if ordered  - Administer IV fluids as ordered to ensure adequate hydration  - Administer ordered medications as needed  - Offer frequent toileting  - Follow urinary retention protocol if ordered  Outcome: Progressing  Goal: Absence of urinary retention  Description  INTERVENTIONS:  - Assess patients ability to void and empty bladder  - Monitor I/O  - Bladder scan as needed  - Discuss with physician/AP medications to alleviate retention as needed  - Discuss catheterization for long term situations as appropriate  Outcome: Progressing  Goal: Urinary catheter remains patent  Description  INTERVENTIONS:  - Assess patency of urinary catheter  - If patient has a chronic wilhelm, consider changing catheter if non-functioning  - Follow guidelines for intermittent irrigation of non-functioning urinary catheter  Outcome: Progressing     Problem: METABOLIC, FLUID AND ELECTROLYTES - ADULT  Goal: Electrolytes maintained within normal limits  Description  INTERVENTIONS:  - Monitor labs and assess patient for signs and symptoms of electrolyte imbalances  - Administer electrolyte replacement as ordered  - Monitor response to electrolyte replacements, including repeat lab results as appropriate  - Instruct patient on fluid and nutrition as appropriate  Outcome: Progressing  Goal: Fluid balance maintained  Description  INTERVENTIONS:  - Monitor labs   - Monitor I/O and WT  - Instruct patient on fluid and nutrition as appropriate  - Assess for signs & symptoms of volume excess or deficit  Outcome: Progressing  Goal: Glucose maintained within target range  Description  INTERVENTIONS:  - Monitor Blood Glucose as ordered  - Assess for signs and symptoms of hyperglycemia and hypoglycemia  - Administer ordered medications to maintain glucose within target range  - Assess nutritional intake and initiate nutrition service referral as needed  Outcome: Progressing

## 2020-02-10 NOTE — ASSESSMENT & PLAN NOTE
· Secondary to UTI from cysto with stent placement on 2/6  · Admitted with complaints of N/V, anorexia, syncope  · Sepsis protocol initiated in ED  · Received cefepime, 4L IVF, placed on levophed infusion for persistent hypotension  · Blood cultures, urine culture sent  · Follow cultures, wbc, temps, procalcitonin

## 2020-02-10 NOTE — ASSESSMENT & PLAN NOTE
· Patient developed Sameul [place]; on initial assessment he was awake and alert, however he became unresponsive during attempt at intervention and CPR, ACLS protocol immediately started   He had one round of CPR with defibrillation and achieved ROSC  · He suddenly woke up following the code and is awake, alert, interactive  · Amiodarone infusion transitioned to amiodarone PO  · Currently in NSR  · Echo results pending  · Appreciate cardiology's assessment  · Monitor closely

## 2020-02-10 NOTE — ASSESSMENT & PLAN NOTE
· Creatinine on 1/28/20 was 1 33, high 2 2- now down trending  · Will monitor renal indices and urine output  · Avoid nephrotoxins

## 2020-02-10 NOTE — PROGRESS NOTES
Progress Note - Cardiology   Oral Tatum 71 y o  male MRN: [id number]  Unit/Bed#: ICU 14 Encounter: [id number]      Assessment/Recommendations/Discussion:   1  Intractable nausea and vomiting secondary to UTI and sepsis syndrome  2  Atrial fibrillation with subsequent ventricular tachycardia requiring CPR/cardioversion 02/09/2020, suspect secondary to above  3  Type 2 MI secondary to nausea, vomiting, UTI/sepsis    · Will review echocardiogram today to evaluate cardiac structure and function  · So far telemetry unremarkable after yesterday's event with exception of nonsustained SVT/atrial tachycardia  Add metoprolol succinate 50 po daily  · IV amiodarone discontinued  Will start amiodarone 200 PO TID x 10 days, then 200 mg once daily for 2 months, as directed by EP   · Will schedule pharmacologic nuclear stress test tomorrow  If within normal limits, will pursue loop recorder implantation to evaluate for atrial fibrillation burden/VT in the outpatient setting  Will arrange outpatient follow-up with Dr Erin Mendiola after d/c          Subjective: Patient seen and examined, feels well, denies dyspnea    Some chest soreness due to CPR and shock yesterday          Physical Exam:  GEN:  NAD  HEENT:  MMM, NCAT, pink conjunctiva, EOMI, nonicteric sclera  CV:  NO JVD/HJR, RR, NO M/R/G, +S1/S2, NO PARASTERNAL HEAVE/THRILL, NO LE EDEMA, NO HEPATIC SYSTOLIC PULSATION, WARM EXTREMITIES  RESP:  CTAB/L  ABD:  SOFT, NT, NO GROSS ORGANOMEGALY        Vitals:   /92   Pulse 74   Temp 98 3 °F (36 8 °C) (Temporal)   Resp 19   Ht 6' 1" (1 854 m)   Wt 107 kg (235 lb 7 2 oz)   SpO2 91%   BMI 31 06 kg/m²   Vitals:    02/08/20 2318 02/09/20 0330   Weight: 100 kg (221 lb 5 5 oz) 107 kg (235 lb 7 2 oz)       Intake/Output Summary (Last 24 hours) at 2/10/2020 1128  Last data filed at 2/10/2020 0800  Gross per 24 hour   Intake 1408 63 ml   Output 750 ml   Net 658 63 ml       TELEMETRY:  Sinus rhythm, SVT/atrial tachycardia, nonsustained  Lab Results:  Results from last 7 days   Lab Units 02/10/20  0500   WBC Thousand/uL 14 57*   HEMOGLOBIN g/dL 14 4   HEMATOCRIT % 45 0   PLATELETS Thousands/uL 233     Results from last 7 days   Lab Units 02/10/20  0500  02/08/20  2354   POTASSIUM mmol/L 3 5   < > 5 2   CHLORIDE mmol/L 103   < > 101   CO2 mmol/L 22   < > 21   BUN mg/dL 37*   < > 39*   CREATININE mg/dL 1 78*   < > 2 22*   CALCIUM mg/dL 8 4   < > 8 1*   ALK PHOS U/L  --   --  64   ALT U/L  --   --  32   AST U/L  --   --  58*    < > = values in this interval not displayed       Results from last 7 days   Lab Units 02/10/20  0500   POTASSIUM mmol/L 3 5   CHLORIDE mmol/L 103   CO2 mmol/L 22   BUN mg/dL 37*   CREATININE mg/dL 1 78*   CALCIUM mg/dL 8 4           Medications:    Current Facility-Administered Medications:     acetaminophen (TYLENOL) tablet 975 mg, 975 mg, Oral, Q8H PRN, Adolfo Sims MD, 975 mg at 02/10/20 0509    amiodarone (CORDARONE) 900 mg in dextrose 5 % 500 mL infusion, 0 5 mg/min, Intravenous, Continuous, DMITRY Biggs, Last Rate: 16 7 mL/hr at 02/09/20 0813, 0 5 mg/min at 02/09/20 0813    cefTRIAXone (ROCEPHIN) 2,000 mg in dextrose 5 % 50 mL IVPB, 2,000 mg, Intravenous, Q24H, DMITRY Chang, Last Rate: 100 mL/hr at 02/09/20 1150, 2,000 mg at 02/09/20 1150    heparin (porcine) subcutaneous injection 5,000 Units, 5,000 Units, Subcutaneous, Q8H [address], DMITRY Chang, 5,000 Units at 02/10/20 0509    insulin lispro (HumaLOG) 100 units/mL subcutaneous injection 1-5 Units, 1-5 Units, Subcutaneous, HS, DMITRY Wilkinson, 2 Units at 02/09/20 2306    insulin lispro (HumaLOG) 100 units/mL subcutaneous injection 1-6 Units, 1-6 Units, Subcutaneous, TID AC, 1 Units at 02/10/20 0752 **AND** Fingerstick Glucose (POCT), , , TID AC, DMITRY Wilkinson    lactated ringers infusion, 125 mL/hr, Intravenous, Continuous, DMITRY Chang, Last Rate: 125 mL/hr at 02/09/20 1149, 125 mL/hr at 02/09/20 1149    melatonin tablet 6 mg, 6 mg, Oral, HS, DMITRY Pryor    norepinephrine (LEVOPHED) 4 mg (STANDARD CONCENTRATION) IV in sodium chloride 0 9% 250 mL, 1-30 mcg/min, Intravenous, Titrated, DMITRY Mcginnis, Stopped at 02/09/20 1548    This note was completed in part utilizing M-Modal Fluency Direct Software  Grammatical errors, random word insertions, spelling mistakes, and incomplete sentences may be an occasional consequence of this system secondary to software limitations, ambient noise, and hardware issues  If you have any questions or concerns about the content, text, or information contained within the body of this dictation, please contact the provider for clarification

## 2020-02-10 NOTE — PROGRESS NOTES
Progress Note - ICU Transfer to SD/MS tele/MS   Prabha Sarasota 71 y o  male MRN: [id number]  [facility]   Unit/Bed#: ICU 70 Encounter: [id number]        Cardiac arrest with successful resuscitation [facility])  Assessment & Plan  · Patient developed Vtach; on initial assessment he was awake and alert, however he became unresponsive during attempt at intervention and CPR, ACLS protocol immediately started   He had one round of CPR with defibrillation and achieved ROSC  · He suddenly woke up following the code and is awake, alert, interactive  · Amiodarone infusion transitioned to amiodarone PO  · Currently in NSR  · Echo results pending  · Appreciate cardiology's assessment  · Monitor closely    * [facility] shock ([facility] )  Assessment & Plan  · Secondary to UTI from cysto with stent placement on 2/6  · Admitted with complaints of N/V, anorexia, syncope  · Sepsis protocol initiated in ED  · Received cefepime, 4L IVF, placed on levophed infusion for persistent hypotension-improved  · Blood cultures, urine culture sent  · Follow cultures, wbc, temps, procalcitonin        DAVINA (acute kidney injury) ([facility] )  Assessment & Plan  · Creatinine on 1/28/20 was 1 33, now 1 7 which is improved from 2 2  · Will monitor renal indices and urine output  · Avoid nephrotoxins    UTI (urinary tract infection)  Assessment & Plan  · Urine culture mixed contamintes  · Continue ceftriaxone      Kidney stone  Assessment & Plan  · S/P stent placement on 2/6/20  · CT scan revealed air and stranding in left renal pelvis which could be related to recent stent, stent appears to have migrated  · Urology assistance is appreciated- no intervention required at this time    BPH without obstruction/lower urinary tract symptoms  Assessment & Plan  · Urinary retention protocol  · Takes flomax as outpatient, will hold until BP stable          Code Status: Level 1 - Full Code  POA:    Referring Physician: Dr Marilyn Bella  Accepting Physician:  Boaz Kenyon  _____________________________________________________________________    Reason for ICU/SD admission: cardiac arrest    History of Present Illness: Keaton Traore is a 71 y o  male with a PMH of DM2, Hypertension, hyperlipidemia and kidney stones who presents with chills, nausea, vomiting, urinary frequency for past 2 days  He experienced a syncopal episode at home which prompted activation of EMS  Patient had a left ureteral stent placed on 2/6 for a kidney stone and generally not been feeling well since the procedure  Shortly after arrival to ED patient developed rapid afib in the 180s with hypotension, followed by Subhash Arteaga  He was initially responsive during the vtach but became unresponsive as witnessed by staff and immediately resuscitated  ROSC was achieved after one round of CPR with defibrillation  He was given an amiodarone bolus at that time  He then developed bradycardia and apnea, upon preparation for intubation, patient woke up with full neurologic response  BP was persistently low despite fluid resuscitation requiring addition of levophed infusion  ED work up revealed a WBC of 20, lactic acidosis of 3, +u/a with an acute kidney injury  CAP CT reveals no acute process, no PE  Patient denied chest pain in ED and before arrival to hospital    History obtained from chart review and the patient  Summary of clinical course: patient was admitted to the ICU and was maintained on levophed and amiodarone  The levophed was able to be titrated off  Amiodarone was transtioned to PO  Cardiology is following  The patient procalcitonin was elevated and he remained on ceftriaxone for possible UTI  Urine culture showed mixed contaminates  Tailor antibiotics once procalcitonin decreases       Consultants: cardiology, urology  _____________________________________________________________________    Diagnostic Data:  CBC:  Results from last 7 days   Lab Units 02/10/20  0500   WBC Thousand/uL 14 57*   HEMOGLOBIN g/dL 14 4   HEMATOCRIT % 45 0   PLATELETS Thousands/uL 233      CMP:   Lab Results   Component Value Date    SODIUM 139 02/10/2020    K 3 5 02/10/2020     02/10/2020    CO2 22 02/10/2020    BUN 37 (H) 02/10/2020    CREATININE 1 78 (H) 02/10/2020    CALCIUM 8 4 02/10/2020    EGFR 38 02/10/2020   ,   PT/INR: No results found for: PT, INR,   Magnesium: No components found for: MAG,  Phosphorous: No results found for: PHOS    ABG: No results found for: PHART, FVE9VEZ, PO2ART, XRG8RHG, H9CXJJPE, BEART, SOURCE,     Microbiology:  Results from last 7 days   Lab Units 02/09/20  0242 02/08/20  2355 02/08/20  2354   BLOOD CULTURE   --  No Growth at 24 hrs  No Growth at 24 hrs  URINE CULTURE  <10,000 cfu/ml   --   --        Imaging: I have personally reviewed the pertinent imaging studies on the PACS system  CTA: No evidence of pulmonary embolus  Malposition left-sided ureteral stent with its tip in the distal ureter  Proximal portion of the stent coiled within the renal pelvis  Mild left-sided perinephric stranding  Clinical correlation for infection    Cardiac/EKG/telemetry/Echo:   Results from last 7 days   Lab Units 02/09/20  0906 02/09/20  0556 02/09/20  0344   TROPONIN I ng/mL 0 53* 0 34* 0 17*     SR  _____________________________________________________________________    Recent or scheduled procedures: none    Outstanding/pending diagnostics:      Mobilization Plan: activity as tolerated    Home medications that are not reordered and reason why:     Spoke with [phone] regarding transfer  Please call [phone] with any questions or concerns  Portions of the record may have been created with voice recognition software  Occasional wrong word or "sound a like" substitutions may have occurred due to the inherent limitations of voice recognition software  Read the chart carefully and recognize, using context, where substitutions have occurred      DMITRY Hassan

## 2020-02-10 NOTE — ASSESSMENT & PLAN NOTE
· Patient developed Lynda Aschoff; on initial assessment he was awake and alert, however he became unresponsive during attempt at intervention and CPR, ACLS protocol immediately started   He had one round of CPR with defibrillation and achieved ROSC  · He suddenly woke up following the code and is awake, alert, interactive  · He remains on an amiodarone infusion- may be able to transition to PO amio soon  · Currently in NSR  · Obtain echo  · Appreciate cardiology's assessment  · Monitor closely

## 2020-02-11 ENCOUNTER — APPOINTMENT (INPATIENT)
Dept: NUCLEAR MEDICINE | Facility: HOSPITAL | Age: 70
DRG: 871 | End: 2020-02-11
Payer: COMMERCIAL

## 2020-02-11 ENCOUNTER — APPOINTMENT (INPATIENT)
Dept: NON INVASIVE DIAGNOSTICS | Facility: HOSPITAL | Age: 70
DRG: 871 | End: 2020-02-11
Payer: COMMERCIAL

## 2020-02-11 PROBLEM — I48.0 PAROXYSMAL ATRIAL FIBRILLATION (HCC): Status: ACTIVE | Noted: 2020-02-11

## 2020-02-11 LAB
ANION GAP SERPL CALCULATED.3IONS-SCNC: 11 MMOL/L (ref 4–13)
BUN SERPL-MCNC: 32 MG/DL (ref 5–25)
CA-I BLD-SCNC: 1.16 MMOL/L (ref 1.12–1.32)
CALCIUM SERPL-MCNC: 9.1 MG/DL (ref 8.3–10.1)
CHLORIDE SERPL-SCNC: 103 MMOL/L (ref 100–108)
CO2 SERPL-SCNC: 26 MMOL/L (ref 21–32)
CREAT SERPL-MCNC: 1.57 MG/DL (ref 0.6–1.3)
ERYTHROCYTE [DISTWIDTH] IN BLOOD BY AUTOMATED COUNT: 13.5 % (ref 11.6–15.1)
GFR SERPL CREATININE-BSD FRML MDRD: 44 ML/MIN/1.73SQ M
GLUCOSE SERPL-MCNC: 191 MG/DL (ref 65–140)
GLUCOSE SERPL-MCNC: 204 MG/DL (ref 65–140)
GLUCOSE SERPL-MCNC: 238 MG/DL (ref 65–140)
GLUCOSE SERPL-MCNC: 308 MG/DL (ref 65–140)
GLUCOSE SERPL-MCNC: 367 MG/DL (ref 65–140)
HCT VFR BLD AUTO: 42.2 % (ref 36.5–49.3)
HGB BLD-MCNC: 13.4 G/DL (ref 12–17)
MCH RBC QN AUTO: 29.7 PG (ref 26.8–34.3)
MCHC RBC AUTO-ENTMCNC: 31.8 G/DL (ref 31.4–37.4)
MCV RBC AUTO: 94 FL (ref 82–98)
PLATELET # BLD AUTO: 261 THOUSANDS/UL (ref 149–390)
PMV BLD AUTO: 10.3 FL (ref 8.9–12.7)
POTASSIUM SERPL-SCNC: 3.9 MMOL/L (ref 3.5–5.3)
PROCALCITONIN SERPL-MCNC: 5.7 NG/ML
RBC # BLD AUTO: 4.51 MILLION/UL (ref 3.88–5.62)
SODIUM SERPL-SCNC: 140 MMOL/L (ref 136–145)
WBC # BLD AUTO: 10.77 THOUSAND/UL (ref 4.31–10.16)

## 2020-02-11 PROCEDURE — 82330 ASSAY OF CALCIUM: CPT | Performed by: PHYSICIAN ASSISTANT

## 2020-02-11 PROCEDURE — 82948 REAGENT STRIP/BLOOD GLUCOSE: CPT

## 2020-02-11 PROCEDURE — A9502 TC99M TETROFOSMIN: HCPCS

## 2020-02-11 PROCEDURE — 93017 CV STRESS TEST TRACING ONLY: CPT

## 2020-02-11 PROCEDURE — 78452 HT MUSCLE IMAGE SPECT MULT: CPT

## 2020-02-11 PROCEDURE — 85027 COMPLETE CBC AUTOMATED: CPT | Performed by: PHYSICIAN ASSISTANT

## 2020-02-11 PROCEDURE — 99231 SBSQ HOSP IP/OBS SF/LOW 25: CPT | Performed by: INTERNAL MEDICINE

## 2020-02-11 PROCEDURE — 93018 CV STRESS TEST I&R ONLY: CPT | Performed by: INTERNAL MEDICINE

## 2020-02-11 PROCEDURE — 93016 CV STRESS TEST SUPVJ ONLY: CPT | Performed by: INTERNAL MEDICINE

## 2020-02-11 PROCEDURE — 84145 PROCALCITONIN (PCT): CPT | Performed by: PHYSICIAN ASSISTANT

## 2020-02-11 PROCEDURE — 80048 BASIC METABOLIC PNL TOTAL CA: CPT | Performed by: PHYSICIAN ASSISTANT

## 2020-02-11 PROCEDURE — 99232 SBSQ HOSP IP/OBS MODERATE 35: CPT | Performed by: STUDENT IN AN ORGANIZED HEALTH CARE EDUCATION/TRAINING PROGRAM

## 2020-02-11 PROCEDURE — 78452 HT MUSCLE IMAGE SPECT MULT: CPT | Performed by: INTERNAL MEDICINE

## 2020-02-11 RX ADMIN — MELATONIN TAB 3 MG 6 MG: 3 TAB at 21:12

## 2020-02-11 RX ADMIN — INSULIN LISPRO 3 UNITS: 100 INJECTION, SOLUTION INTRAVENOUS; SUBCUTANEOUS at 16:20

## 2020-02-11 RX ADMIN — AMIODARONE HYDROCHLORIDE 200 MG: 200 TABLET ORAL at 06:30

## 2020-02-11 RX ADMIN — OXYCODONE HYDROCHLORIDE AND ACETAMINOPHEN 1 TABLET: 5; 325 TABLET ORAL at 16:16

## 2020-02-11 RX ADMIN — HEPARIN SODIUM 5000 UNITS: 5000 INJECTION INTRAVENOUS; SUBCUTANEOUS at 21:14

## 2020-02-11 RX ADMIN — HEPARIN SODIUM 5000 UNITS: 5000 INJECTION INTRAVENOUS; SUBCUTANEOUS at 14:43

## 2020-02-11 RX ADMIN — INSULIN LISPRO 2 UNITS: 100 INJECTION, SOLUTION INTRAVENOUS; SUBCUTANEOUS at 07:30

## 2020-02-11 RX ADMIN — METOPROLOL SUCCINATE 50 MG: 50 TABLET, EXTENDED RELEASE ORAL at 10:35

## 2020-02-11 RX ADMIN — CEFTRIAXONE 2000 MG: 2 INJECTION, POWDER, FOR SOLUTION INTRAMUSCULAR; INTRAVENOUS at 12:40

## 2020-02-11 RX ADMIN — INSULIN LISPRO 6 UNITS: 100 INJECTION, SOLUTION INTRAVENOUS; SUBCUTANEOUS at 12:43

## 2020-02-11 RX ADMIN — FLUTICASONE PROPIONATE 1 SPRAY: 50 SPRAY, METERED NASAL at 10:35

## 2020-02-11 RX ADMIN — REGADENOSON 0.4 MG: 0.08 INJECTION, SOLUTION INTRAVENOUS at 09:15

## 2020-02-11 RX ADMIN — AMIODARONE HYDROCHLORIDE 200 MG: 200 TABLET ORAL at 16:16

## 2020-02-11 RX ADMIN — HEPARIN SODIUM 5000 UNITS: 5000 INJECTION INTRAVENOUS; SUBCUTANEOUS at 06:30

## 2020-02-11 RX ADMIN — OXYCODONE HYDROCHLORIDE AND ACETAMINOPHEN 1 TABLET: 5; 325 TABLET ORAL at 11:44

## 2020-02-11 RX ADMIN — INSULIN LISPRO 3 UNITS: 100 INJECTION, SOLUTION INTRAVENOUS; SUBCUTANEOUS at 21:15

## 2020-02-11 RX ADMIN — AMIODARONE HYDROCHLORIDE 200 MG: 200 TABLET ORAL at 11:43

## 2020-02-11 NOTE — PROGRESS NOTES
Progress Note - Cardiology   Debi Amezcua 71 y o  male MRN: [id number]  Unit/Bed#: E4 -01 Encounter: [id number]      Assessment/Recommendations/Discussion:   1  Intractable nausea and vomiting secondary to UTI and sepsis syndrome  2  Atrial fibrillation with subsequent ventricular tachycardia requiring CPR/cardioversion 02/09/2020, suspect secondary to above  3  Type 2 MI secondary to nausea, vomiting, UTI/sepsis      · No further events on telemetry  Continue p o  Amiodarone continue loading for 1 week, then down to 200 mg once daily  · Nuclear stress test completed this morning, results are pending  If no ischemia, will plan for loop recorder implantation  · Continue metoprolol succinate 50 mg daily  · Echocardiogram 02/11/2020 with ejection fraction 89%, grade 1 diastolic dysfunction, mild diffuse hypokinesis  · No symptoms of angina          Subjective: Pt seen/examined    Feels well today, no further N/V, no CP/SOB          Physical Exam:  GEN:  NAD  HEENT:  MMM, NCAT, pink conjunctiva, EOMI, nonicteric sclera  CV:  NO JVD/HJR, RR, NO M/R/G, +S1/S2, NO PARASTERNAL HEAVE/THRILL, NO LE EDEMA, NO HEPATIC SYSTOLIC PULSATION, WARM EXTREMITIES  RESP:  CTAB/L  ABD:  SOFT, NT, NO GROSS ORGANOMEGALY        Vitals:   /92 (BP Location: Right arm)   Pulse 60   Temp 97 6 °F (36 4 °C) (Tympanic)   Resp 22   Ht 6' 1" (1 854 m)   Wt 107 kg (235 lb 7 2 oz)   SpO2 95%   BMI 31 06 kg/m²   Vitals:    02/08/20 2318 02/09/20 0330   Weight: 100 kg (221 lb 5 5 oz) 107 kg (235 lb 7 2 oz)       Intake/Output Summary (Last 24 hours) at 2/11/2020 1211  Last data filed at 2/11/2020 0101  Gross per 24 hour   Intake 50 ml   Output 800 ml   Net -750 ml       TELEMETRY: SR  Lab Results:  Results from last 7 days   Lab Units 02/11/20  0457   WBC Thousand/uL 10 77*   HEMOGLOBIN g/dL 13 4   HEMATOCRIT % 42 2   PLATELETS Thousands/uL 261     Results from last 7 days   Lab Units 02/11/20  0456  02/08/20  2354   POTASSIUM mmol/L 3 9   < > 5 2   CHLORIDE mmol/L 103   < > 101   CO2 mmol/L 26   < > 21   BUN mg/dL 32*   < > 39*   CREATININE mg/dL 1 57*   < > 2 22*   CALCIUM mg/dL 9 1   < > 8 1*   ALK PHOS U/L  --   --  64   ALT U/L  --   --  32   AST U/L  --   --  58*    < > = values in this interval not displayed       Results from last 7 days   Lab Units 02/11/20  0456   POTASSIUM mmol/L 3 9   CHLORIDE mmol/L 103   CO2 mmol/L 26   BUN mg/dL 32*   CREATININE mg/dL 1 57*   CALCIUM mg/dL 9 1           Medications:    Current Facility-Administered Medications:     acetaminophen (TYLENOL) tablet 975 mg, 975 mg, Oral, Q8H PRN, DMITRY Felipe, 975 mg at 02/10/20 0509    amiodarone tablet 200 mg, 200 mg, Oral, TID With Meals, DMITRY Felipe, 200 mg at 02/11/20 1143    cefTRIAXone (ROCEPHIN) 2,000 mg in dextrose 5 % 50 mL IVPB, 2,000 mg, Intravenous, Q24H, DMITRY Espinoza, Stopped at 02/10/20 1223    fluticasone (FLONASE) 50 mcg/act nasal spray 1 spray, 1 spray, Each Nare, Daily, DMITRY Espinoza, 1 spray at 02/11/20 1035    heparin (porcine) subcutaneous injection 5,000 Units, 5,000 Units, Subcutaneous, Q8H [facility], DMITRY Espinoza, 5,000 Units at 02/11/20 0630    insulin lispro (HumaLOG) 100 units/mL subcutaneous injection 1-5 Units, 1-5 Units, Subcutaneous, HS, DMITRY Espinoza, 2 Units at 02/10/20 2122    insulin lispro (HumaLOG) 100 units/mL subcutaneous injection 1-6 Units, 1-6 Units, Subcutaneous, TID AC, 2 Units at 02/11/20 0730 **AND** Fingerstick Glucose (POCT), , , TID AC, DMITRY Espinoza    melatonin tablet 6 mg, 6 mg, Oral, HS, DMITRY Espinoza, 6 mg at 02/10/20 2122    metoprolol succinate (TOPROL-XL) 24 hr tablet 50 mg, 50 mg, Oral, Daily, DMITRY Espinoza, 50 mg at 02/11/20 1035    oxyCODONE-acetaminophen (PERCOCET) 5-325 mg per tablet 1 tablet, 1 tablet, Oral, Q4H PRN, 1 tablet at 02/11/20 1144 **OR** oxyCODONE-acetaminophen (PERCOCET) 5-325 mg per tablet 2 tablet, 2 tablet, Oral, Q4H PRN, DMITRY Simon    This note was completed in part utilizing M-Modal Fluency Direct Software  Grammatical errors, random word insertions, spelling mistakes, and incomplete sentences may be an occasional consequence of this system secondary to software limitations, ambient noise, and hardware issues  If you have any questions or concerns about the content, text, or information contained within the body of this dictation, please contact the provider for clarification

## 2020-02-11 NOTE — PLAN OF CARE
Problem: Potential for Falls  Goal: Patient will remain free of falls  Description  INTERVENTIONS:  - Assess patient frequently for physical needs  -  Identify cognitive and physical deficits and behaviors that affect risk of falls    -  [place] fall precautions as indicated by assessment   - Educate patient/family on patient safety including physical limitations  - Instruct patient to call for assistance with activity based on assessment  - Modify environment to reduce risk of injury  - Consider OT/PT consult to assist with strengthening/mobility  Outcome: Progressing     Problem: PAIN - ADULT  Goal: Verbalizes/displays adequate comfort level or baseline comfort level  Description  Interventions:  - Encourage patient to monitor pain and request assistance  - Assess pain using appropriate pain scale  - Administer analgesics based on type and severity of pain and evaluate response  - Implement non-pharmacological measures as appropriate and evaluate response  - Consider cultural and social influences on pain and pain management  - Notify physician/advanced practitioner if interventions unsuccessful or patient reports new pain  Outcome: Progressing     Problem: INFECTION - ADULT  Goal: Absence or prevention of progression during hospitalization  Description  INTERVENTIONS:  - Assess and monitor for signs and symptoms of infection  - Monitor lab/diagnostic results  - Monitor all insertion sites, i e  indwelling lines, tubes, and drains  - Monitor endotracheal if appropriate and nasal secretions for changes in amount and color  - [place] appropriate cooling/warming therapies per order  - Administer medications as ordered  - Instruct and encourage patient and family to use good hand hygiene technique  - Identify and instruct in appropriate isolation precautions for identified infection/condition  Outcome: Progressing  Goal: Absence of fever/infection during neutropenic period  Description  INTERVENTIONS:  - Monitor WBC    Outcome: Progressing     Problem: SAFETY ADULT  Goal: Maintain or return to baseline ADL function  Description  INTERVENTIONS:  -  Assess patient's ability to carry out ADLs; assess patient's baseline for ADL function and identify physical deficits which impact ability to perform ADLs (bathing, care of mouth/teeth, toileting, grooming, dressing, etc )  - Assess/evaluate cause of self-care deficits   - Assess range of motion  - Assess patient's mobility; develop plan if impaired  - Assess patient's need for assistive devices and provide as appropriate  - Encourage maximum independence but intervene and supervise when necessary  - Involve family in performance of ADLs  - Assess for home care needs following discharge   - Consider OT consult to assist with ADL evaluation and planning for discharge  - Provide patient education as appropriate  Outcome: Progressing  Goal: Maintain or return mobility status to optimal level  Description  INTERVENTIONS:  - Assess patient's baseline mobility status (ambulation, transfers, stairs, etc )    - Identify cognitive and physical deficits and behaviors that affect mobility  - Identify mobility aids required to assist with transfers and/or ambulation (gait belt, sit-to-stand, lift, walker, cane, etc )  - [place] fall precautions as indicated by assessment  - Record patient progress and toleration of activity level on Mobility SBAR; progress patient to next Phase/Stage  - Instruct patient to call for assistance with activity based on assessment  - Consider rehabilitation consult to assist with strengthening/weightbearing, etc   Outcome: Progressing     Problem: DISCHARGE PLANNING  Goal: Discharge to home or other facility with appropriate resources  Description  INTERVENTIONS:  - Identify barriers to discharge w/patient and caregiver  - Arrange for needed discharge resources and transportation as appropriate  - Identify discharge learning needs (meds, wound care, etc )  - Arrange for interpretive services to assist at discharge as needed  - Refer to Case Management Department for coordinating discharge planning if the patient needs post-hospital services based on physician/advanced practitioner order or complex needs related to functional status, cognitive ability, or social support system  Outcome: Progressing     Problem: Knowledge Deficit  Goal: Patient/family/caregiver demonstrates understanding of disease process, treatment plan, medications, and discharge instructions  Description  Complete learning assessment and assess knowledge base    Interventions:  - Provide teaching at level of understanding  - Provide teaching via preferred learning methods  Outcome: Progressing     Problem: CARDIOVASCULAR - ADULT  Goal: Maintains optimal cardiac output and hemodynamic stability  Description  INTERVENTIONS:  - Monitor I/O, vital signs and rhythm  - Monitor for S/S and trends of decreased cardiac output  - Administer and titrate ordered vasoactive medications to optimize hemodynamic stability  - Assess quality of pulses, skin color and temperature  - Assess for signs of decreased coronary artery perfusion  - Instruct patient to report change in severity of symptoms  Outcome: Progressing  Goal: Absence of cardiac dysrhythmias or at baseline rhythm  Description  INTERVENTIONS:  - Continuous cardiac monitoring, vital signs, obtain 12 lead EKG if ordered  - Administer antiarrhythmic and heart rate control medications as ordered  - Monitor electrolytes and administer replacement therapy as ordered  Outcome: Progressing     Problem: GENITOURINARY - ADULT  Goal: Maintains or returns to baseline urinary function  Description  INTERVENTIONS:  - Assess urinary function  - Encourage oral fluids to ensure adequate hydration if ordered  - Administer IV fluids as ordered to ensure adequate hydration  - Administer ordered medications as needed  - Offer frequent toileting  - Follow urinary retention protocol if ordered  Outcome: Progressing  Goal: Absence of urinary retention  Description  INTERVENTIONS:  - Assess patients ability to void and empty bladder  - Monitor I/O  - Bladder scan as needed  - Discuss with physician/AP medications to alleviate retention as needed  - Discuss catheterization for long term situations as appropriate  Outcome: Progressing  Goal: Urinary catheter remains patent  Description  INTERVENTIONS:  - Assess patency of urinary catheter  - If patient has a chronic wilhelm, consider changing catheter if non-functioning  - Follow guidelines for intermittent irrigation of non-functioning urinary catheter  Outcome: Progressing     Problem: METABOLIC, FLUID AND ELECTROLYTES - ADULT  Goal: Electrolytes maintained within normal limits  Description  INTERVENTIONS:  - Monitor labs and assess patient for signs and symptoms of electrolyte imbalances  - Administer electrolyte replacement as ordered  - Monitor response to electrolyte replacements, including repeat lab results as appropriate  - Instruct patient on fluid and nutrition as appropriate  Outcome: Progressing  Goal: Fluid balance maintained  Description  INTERVENTIONS:  - Monitor labs   - Monitor I/O and WT  - Instruct patient on fluid and nutrition as appropriate  - Assess for signs & symptoms of volume excess or deficit  Outcome: Progressing  Goal: Glucose maintained within target range  Description  INTERVENTIONS:  - Monitor Blood Glucose as ordered  - Assess for signs and symptoms of hyperglycemia and hypoglycemia  - Administer ordered medications to maintain glucose within target range  - Assess nutritional intake and initiate nutrition service referral as needed  Outcome: Progressing

## 2020-02-11 NOTE — PLAN OF CARE
Problem: Potential for Falls  Goal: Patient will remain free of falls  Description  INTERVENTIONS:  - Assess patient frequently for physical needs  -  Identify cognitive and physical deficits and behaviors that affect risk of falls    -  [place] fall precautions as indicated by assessment   - Educate patient/family on patient safety including physical limitations  - Instruct patient to call for assistance with activity based on assessment  - Modify environment to reduce risk of injury  - Consider OT/PT consult to assist with strengthening/mobility  Outcome: Progressing     Problem: PAIN - ADULT  Goal: Verbalizes/displays adequate comfort level or baseline comfort level  Description  Interventions:  - Encourage patient to monitor pain and request assistance  - Assess pain using appropriate pain scale  - Administer analgesics based on type and severity of pain and evaluate response  - Implement non-pharmacological measures as appropriate and evaluate response  - Consider cultural and social influences on pain and pain management  - Notify physician/advanced practitioner if interventions unsuccessful or patient reports new pain  Outcome: Progressing     Problem: INFECTION - ADULT  Goal: Absence or prevention of progression during hospitalization  Description  INTERVENTIONS:  - Assess and monitor for signs and symptoms of infection  - Monitor lab/diagnostic results  - Monitor all insertion sites, i e  indwelling lines, tubes, and drains  - Monitor endotracheal if appropriate and nasal secretions for changes in amount and color  - [place] appropriate cooling/warming therapies per order  - Administer medications as ordered  - Instruct and encourage patient and family to use good hand hygiene technique  - Identify and instruct in appropriate isolation precautions for identified infection/condition  Outcome: Progressing  Goal: Absence of fever/infection during neutropenic period  Description  INTERVENTIONS:  - Monitor WBC    Outcome: Progressing     Problem: SAFETY ADULT  Goal: Maintain or return to baseline ADL function  Description  INTERVENTIONS:  -  Assess patient's ability to carry out ADLs; assess patient's baseline for ADL function and identify physical deficits which impact ability to perform ADLs (bathing, care of mouth/teeth, toileting, grooming, dressing, etc )  - Assess/evaluate cause of self-care deficits   - Assess range of motion  - Assess patient's mobility; develop plan if impaired  - Assess patient's need for assistive devices and provide as appropriate  - Encourage maximum independence but intervene and supervise when necessary  - Involve family in performance of ADLs  - Assess for home care needs following discharge   - Consider OT consult to assist with ADL evaluation and planning for discharge  - Provide patient education as appropriate  Outcome: Progressing  Goal: Maintain or return mobility status to optimal level  Description  INTERVENTIONS:  - Assess patient's baseline mobility status (ambulation, transfers, stairs, etc )    - Identify cognitive and physical deficits and behaviors that affect mobility  - Identify mobility aids required to assist with transfers and/or ambulation (gait belt, sit-to-stand, lift, walker, cane, etc )  - [place] fall precautions as indicated by assessment  - Record patient progress and toleration of activity level on Mobility SBAR; progress patient to next Phase/Stage  - Instruct patient to call for assistance with activity based on assessment  - Consider rehabilitation consult to assist with strengthening/weightbearing, etc   Outcome: Progressing     Problem: DISCHARGE PLANNING  Goal: Discharge to home or other facility with appropriate resources  Description  INTERVENTIONS:  - Identify barriers to discharge w/patient and caregiver  - Arrange for needed discharge resources and transportation as appropriate  - Identify discharge learning needs (meds, wound care, etc )  - Arrange for interpretive services to assist at discharge as needed  - Refer to Case Management Department for coordinating discharge planning if the patient needs post-hospital services based on physician/advanced practitioner order or complex needs related to functional status, cognitive ability, or social support system  Outcome: Progressing     Problem: Knowledge Deficit  Goal: Patient/family/caregiver demonstrates understanding of disease process, treatment plan, medications, and discharge instructions  Description  Complete learning assessment and assess knowledge base    Interventions:  - Provide teaching at level of understanding  - Provide teaching via preferred learning methods  Outcome: Progressing     Problem: CARDIOVASCULAR - ADULT  Goal: Maintains optimal cardiac output and hemodynamic stability  Description  INTERVENTIONS:  - Monitor I/O, vital signs and rhythm  - Monitor for S/S and trends of decreased cardiac output  - Administer and titrate ordered vasoactive medications to optimize hemodynamic stability  - Assess quality of pulses, skin color and temperature  - Assess for signs of decreased coronary artery perfusion  - Instruct patient to report change in severity of symptoms  Outcome: Progressing  Goal: Absence of cardiac dysrhythmias or at baseline rhythm  Description  INTERVENTIONS:  - Continuous cardiac monitoring, vital signs, obtain 12 lead EKG if ordered  - Administer antiarrhythmic and heart rate control medications as ordered  - Monitor electrolytes and administer replacement therapy as ordered  Outcome: Progressing     Problem: GENITOURINARY - ADULT  Goal: Maintains or returns to baseline urinary function  Description  INTERVENTIONS:  - Assess urinary function  - Encourage oral fluids to ensure adequate hydration if ordered  - Administer IV fluids as ordered to ensure adequate hydration  - Administer ordered medications as needed  - Offer frequent toileting  - Follow urinary retention protocol if ordered  Outcome: Progressing  Goal: Absence of urinary retention  Description  INTERVENTIONS:  - Assess patients ability to void and empty bladder  - Monitor I/O  - Bladder scan as needed  - Discuss with physician/AP medications to alleviate retention as needed  - Discuss catheterization for long term situations as appropriate  Outcome: Progressing  Goal: Urinary catheter remains patent  Description  INTERVENTIONS:  - Assess patency of urinary catheter  - If patient has a chronic wilhelm, consider changing catheter if non-functioning  - Follow guidelines for intermittent irrigation of non-functioning urinary catheter  Outcome: Progressing     Problem: METABOLIC, FLUID AND ELECTROLYTES - ADULT  Goal: Electrolytes maintained within normal limits  Description  INTERVENTIONS:  - Monitor labs and assess patient for signs and symptoms of electrolyte imbalances  - Administer electrolyte replacement as ordered  - Monitor response to electrolyte replacements, including repeat lab results as appropriate  - Instruct patient on fluid and nutrition as appropriate  Outcome: Progressing  Goal: Fluid balance maintained  Description  INTERVENTIONS:  - Monitor labs   - Monitor I/O and WT  - Instruct patient on fluid and nutrition as appropriate  - Assess for signs & symptoms of volume excess or deficit  Outcome: Progressing  Goal: Glucose maintained within target range  Description  INTERVENTIONS:  - Monitor Blood Glucose as ordered  - Assess for signs and symptoms of hyperglycemia and hypoglycemia  - Administer ordered medications to maintain glucose within target range  - Assess nutritional intake and initiate nutrition service referral as needed  Outcome: Progressing

## 2020-02-12 VITALS
HEART RATE: 57 BPM | RESPIRATION RATE: 20 BRPM | DIASTOLIC BLOOD PRESSURE: 86 MMHG | HEIGHT: 73 IN | OXYGEN SATURATION: 96 % | SYSTOLIC BLOOD PRESSURE: 142 MMHG | WEIGHT: 239.64 LBS | BODY MASS INDEX: 31.76 KG/M2 | TEMPERATURE: 97.8 F

## 2020-02-12 LAB
CHEST PAIN STATEMENT: NORMAL
GLUCOSE SERPL-MCNC: 166 MG/DL (ref 65–140)
MAX DIASTOLIC BP: 72 MMHG
MAX HEART RATE: 88 BPM
MAX PREDICTED HEART RATE: 151 BPM
MAX. SYSTOLIC BP: 148 MMHG
PROTOCOL NAME: NORMAL
REASON FOR TERMINATION: NORMAL
TARGET HR FORMULA: NORMAL
TEST INDICATION: NORMAL
TIME IN EXERCISE PHASE: NORMAL

## 2020-02-12 PROCEDURE — 82948 REAGENT STRIP/BLOOD GLUCOSE: CPT

## 2020-02-12 PROCEDURE — 99239 HOSP IP/OBS DSCHRG MGMT >30: CPT | Performed by: STUDENT IN AN ORGANIZED HEALTH CARE EDUCATION/TRAINING PROGRAM

## 2020-02-12 PROCEDURE — 99232 SBSQ HOSP IP/OBS MODERATE 35: CPT | Performed by: PHYSICIAN ASSISTANT

## 2020-02-12 RX ORDER — METOPROLOL SUCCINATE 50 MG/1
50 TABLET, EXTENDED RELEASE ORAL DAILY
Qty: 30 TABLET | Refills: 0 | Status: SHIPPED | OUTPATIENT
Start: 2020-02-13 | End: 2020-03-17 | Stop reason: SDUPTHER

## 2020-02-12 RX ORDER — AMIODARONE HYDROCHLORIDE 200 MG/1
TABLET ORAL
Qty: 25 TABLET | Refills: 0 | Status: SHIPPED | OUTPATIENT
Start: 2020-02-12 | End: 2020-03-17

## 2020-02-12 RX ORDER — CEFPODOXIME PROXETIL 200 MG/1
200 TABLET, FILM COATED ORAL 2 TIMES DAILY
Qty: 14 TABLET | Refills: 0 | Status: SHIPPED | OUTPATIENT
Start: 2020-02-12 | End: 2020-02-19

## 2020-02-12 RX ADMIN — HEPARIN SODIUM 5000 UNITS: 5000 INJECTION INTRAVENOUS; SUBCUTANEOUS at 05:56

## 2020-02-12 RX ADMIN — FLUTICASONE PROPIONATE 1 SPRAY: 50 SPRAY, METERED NASAL at 08:57

## 2020-02-12 RX ADMIN — AMIODARONE HYDROCHLORIDE 200 MG: 200 TABLET ORAL at 08:56

## 2020-02-12 RX ADMIN — METOPROLOL SUCCINATE 50 MG: 50 TABLET, EXTENDED RELEASE ORAL at 08:56

## 2020-02-12 NOTE — PROGRESS NOTES
Progress Note - Cardiology   Jose Valdez 71 y o  male MRN: [id number]  Unit/Bed#: E4 -01 Encounter: [id number]        Asessment/Plan:  1  Hx bladder and ureteral stones - S/p ureteroscopy & laser lithotripsy 3 days prior to admission  2  Sepsis present on admission:  Now resolved  3  DAVINA present on admission:  Resolved  4  Status post cardiac arrest: witnessed in hospital (ED) rapid AF-->VT-->CPR/Defib with ROSC (not inutbated): No neurologic deficits or other specific sequelae   5  Paroxysmal atrial fibrillation and ventricular tachycardia  6  Hypertension:  Currently controlled  7  Diabetes    · Nuclear stress test yesterday with no perfusion defects  Calculated EF 42%, visually similar, and akin to echo  · Patient will be contacted by our office to arrange loop recorder implantation for surveillance of AFib in VT burden (as well as follow-up appointment with electrophysiology)  · With chads Vasc score of 3 and brevity of AF in the setting of sepsis, anticoagulation will, for now, be withheld with AF surveillance as above  · It is currently planned that the patient will continue amiodarone for approximately 2 months with rhythm monitoring as above  Continued t i d  Dosing through 2/16 - then 200mg/day  · Initiated on beta-blocker during this hospitalization ~~> continue same  (Had been taking Lotrel prior to admission for Tx of HTN --> as he's currently normotensive on BB continue to hold lotrel with outpt reassessment of additional Rx need(suggest ACE/ARB +/- thiazide)  · Okay for discharge from cardiology perspective        Subjective:  No complaint to me in eager for discharge      Vitals:  Vitals:    02/09/20 0330 02/12/20 0600   Weight: 107 kg (235 lb 7 2 oz) 109 kg (239 lb 10 2 oz)   ,  Vitals:    02/11/20 1927 02/12/20 0017 02/12/20 0600 02/12/20 0713   BP: 125/82 116/67  142/86   BP Location: Right arm Right arm  Right arm   Pulse: 60 56  57   Resp: 18 18  20   Temp: 98 2 °F (36 8 °C) 98 9 °F (37 2 °C)  97 8 °F (36 6 °C)   TempSrc: Temporal Temporal  Temporal   SpO2: 96% 92%  96%   Weight:   109 kg (239 lb 10 2 oz)    Height:           Exam:  General:  Alert normally conversant and comfortable-appearing  Heart:  Regular with controlled rate and no signature murmur or rub  Lungs:  Clear throughout  Lower Limbs:  No edema           Telemetry:       Normal sinus rhythm with heart rate 60-70s    Medications:    Current Facility-Administered Medications:     acetaminophen (TYLENOL) tablet 975 mg, 975 mg, Oral, Q8H PRN, DMITRY Conklin, 975 mg at 02/10/20 0509    amiodarone tablet 200 mg, 200 mg, Oral, TID With Meals, DMITRY Conklin, 200 mg at 02/11/20 1616    cefTRIAXone (ROCEPHIN) 2,000 mg in dextrose 5 % 50 mL IVPB, 2,000 mg, Intravenous, Q24H, DMITRY Espinoza, Last Rate: 100 mL/hr at 02/11/20 1240, 2,000 mg at 02/11/20 1240    fluticasone (FLONASE) 50 mcg/act nasal spray 1 spray, 1 spray, Each Nare, Daily, DMITRY Espinzoa, 1 spray at 02/11/20 1035    heparin (porcine) subcutaneous injection 5,000 Units, 5,000 Units, Subcutaneous, Q8H [facility], DMITRY Espinoza, 5,000 Units at 02/12/20 0556    insulin lispro (HumaLOG) 100 units/mL subcutaneous injection 1-5 Units, 1-5 Units, Subcutaneous, HS, DMITRY Espinoza, 3 Units at 02/11/20 2115    insulin lispro (HumaLOG) 100 units/mL subcutaneous injection 1-6 Units, 1-6 Units, Subcutaneous, TID AC, 3 Units at 02/11/20 1620 **AND** Fingerstick Glucose (POCT), , , TID AC, DMITRY Espinoza    melatonin tablet 6 mg, 6 mg, Oral, HS, DMITRY Espinoza, 6 mg at 02/11/20 2112    metoprolol succinate (TOPROL-XL) 24 hr tablet 50 mg, 50 mg, Oral, Daily, DMITRY Espinoza, 50 mg at 02/11/20 1035    oxyCODONE-acetaminophen (PERCOCET) 5-325 mg per tablet 1 tablet, 1 tablet, Oral, Q4H PRN, 1 tablet at 02/11/20 1616 **OR** oxyCODONE-acetaminophen (PERCOCET) 5-325 mg per tablet 2 tablet, 2 tablet, Oral, Q4H PRN, DMITRY Conklin      Labs/Data: Results from last 7 days   Lab Units 02/11/20  0457 02/10/20  0500 02/09/20  0556   WBC Thousand/uL 10 77* 14 57* 19 28*   HEMOGLOBIN g/dL 13 4 14 4 12 8   HEMATOCRIT % 42 2 45 0 40 3   PLATELETS Thousands/uL 261 233 205     Results from last 7 days   Lab Units 02/11/20  0456 02/10/20  0500 02/09/20  0906 02/09/20  0556 02/09/20  0344   POTASSIUM mmol/L 3 9 3 5  --  4 0  --    CHLORIDE mmol/L 103 103  --  103  --    CO2 mmol/L 26 22  --  24  --    BUN mg/dL 32* 37*  --  39*  --    TROPONIN I ng/mL  --   --  0 53* 0 34* 0 17*

## 2020-02-12 NOTE — ASSESSMENT & PLAN NOTE
As per urology: "S/p cystourethroscopy with laser of bladder stones, ureteroscopy with laser lithotripsy of left upper ureteral stones"  - Outpaitient urology follow-up with KUB

## 2020-02-12 NOTE — ASSESSMENT & PLAN NOTE
70-year-old male initially admitted due to septic shock as a result of urinary tract infection from lithiasis with stent placement  Cultures no growth to date    - Discontinued IV antibiotic therapy and will discharge him with PO antibiotics

## 2020-02-12 NOTE — ASSESSMENT & PLAN NOTE
As per urology: "S/p cystourethroscopy with laser of bladder stones, ureteroscopy with laser lithotripsy of left upper ureteral stones"

## 2020-02-12 NOTE — PLAN OF CARE
Problem: Potential for Falls  Goal: Patient will remain free of falls  Description  INTERVENTIONS:  - Assess patient frequently for physical needs  -  Identify cognitive and physical deficits and behaviors that affect risk of falls    -  [place] fall precautions as indicated by assessment   - Educate patient/family on patient safety including physical limitations  - Instruct patient to call for assistance with activity based on assessment  - Modify environment to reduce risk of injury  - Consider OT/PT consult to assist with strengthening/mobility  Outcome: Progressing

## 2020-02-12 NOTE — ASSESSMENT & PLAN NOTE
61-year-old male initially admitted due to septic shock as a result of urinary tract infection from lithiasis with stent placement  - continue IV antibiotic therapy    Recent Labs     02/08/20  2354 02/08/20  2355 02/09/20  0242   URINECX  --   --  <10,000 cfu/ml    BLOODCX No Growth at 48 hrs  No Growth at 48 hrs    --

## 2020-02-12 NOTE — PROGRESS NOTES
Progress Note Jannette Raygoza [birth year], 71 y o  male MRN: [id number]    Unit/Bed#: E4 -01 Encounter: [id number]    Primary Care Provider: Neha Cole MD   Date and time admitted to hospital: 2/8/2020 11:14 PM        * Septic shock [facility])  Assessment & Plan  66-year-old male initially admitted due to septic shock as a result of urinary tract infection from lithiasis with stent placement  - continue IV antibiotic therapy    Recent Labs     02/08/20  2354 02/08/20  2355 02/09/20  0242   URINECX  --   --  <10,000 cfu/ml    BLOODCX No Growth at 48 hrs  No Growth at 48 hrs   --          Cardiac arrest with successful resuscitation [facility])  Assessment & Plan  Cardiac arrest with successful resuscitation  - appreciate Cardiology recommendations    UTI (urinary tract infection)  Assessment & Plan  Treatment plan written above    BPH without obstruction/lower urinary tract symptoms  Assessment & Plan  Continue Flomax    Kidney stone  Assessment & Plan  As per urology: "S/p cystourethroscopy with laser of bladder stones, ureteroscopy with laser lithotripsy of left upper ureteral stones"    DAVINA (acute kidney injury) [facility])  Assessment & Plan  Improving    Recent Labs     02/09/20  0556 02/10/20  0500 02/11/20  0456   BUN 39* 37* 32*   CREATININE 2 11* 1 78* 1 57*         Paroxysmal atrial fibrillation (HCC)  Assessment & Plan  Appreciate cardiology recommendations    VTE Pharmacologic Prophylaxis:   Pharmacologic: Heparin  Mechanical VTE Prophylaxis in Place: Yes    Patient Centered Rounds: I have performed bedside rounds with nursing staff today  Discussions with Specialists or Other Care Team Provider: Cardiology    Education and Discussions with Family / Patient: Patient    Time Spent for Care: 30 minutes  More than 50% of total time spent on counseling and coordination of care as described above      Current Length of Stay: 2 day(s)    Current Patient Status: Inpatient   Certification Statement: The patient will continue to require additional inpatient hospital stay due to IV antibiotic therapy    Discharge Plan: Still active    Code Status: Level 1 - Full Code      Subjective:   Patient seen and examined at bedside  He underwent nuclear stress test today  No ischemia noted  As per Cardiology recommendations they are planning for loop recorder implantation  Echo reviewed  Objective:     Vitals:   Temp (24hrs), Av 9 °F (36 6 °C), Min:97 6 °F (36 4 °C), Max:98 6 °F (37 °C)    Temp:  [97 6 °F (36 4 °C)-98 6 °F (37 °C)] 98 2 °F (36 8 °C)  HR:  [60-73] 60  Resp:  [18-48] 18  BP: (117-164)/(71-93) 125/82  SpO2:  [90 %-97 %] 96 %  Body mass index is 31 06 kg/m²  Input and Output Summary (last 24 hours): Intake/Output Summary (Last 24 hours) at 2020 1935  Last data filed at 2020 1401  Gross per 24 hour   Intake    Output 925 ml   Net -925 ml       Physical Exam:     Physical Exam   Constitutional: No distress  HENT:   Head: Normocephalic and atraumatic  Eyes: Pupils are equal, round, and reactive to light  EOM are normal    Neck: Neck supple  Cardiovascular: Normal rate  Exam reveals no friction rub  No murmur heard  Pulmonary/Chest: Effort normal and breath sounds normal  He has no wheezes  He has no rales  Abdominal: Soft  Bowel sounds are normal  He exhibits no distension  There is no tenderness  There is no guarding  Musculoskeletal: He exhibits no edema  Neurological: He is alert  Skin: Skin is warm and dry  Psychiatric: He has a normal mood and affect  Vitals reviewed  Additional Data:     Labs:    Results from last 7 days   Lab Units 20  0457  20  0556   WBC Thousand/uL 10 77*   < > 19 28*   HEMOGLOBIN g/dL 13 4   < > 12 8   HEMATOCRIT % 42 2   < > 40 3   PLATELETS Thousands/uL 261   < > 205   NEUTROS PCT %  --   --  86*   LYMPHS PCT %  --   --  3*   MONOS PCT %  --   --  9   EOS PCT %  --   --  0    < > = values in this interval not displayed  Results from last 7 days   Lab Units 02/11/20  0456  02/08/20  2354   SODIUM mmol/L 140   < > 135*   POTASSIUM mmol/L 3 9   < > 5 2   CHLORIDE mmol/L 103   < > 101   CO2 mmol/L 26   < > 21   BUN mg/dL 32*   < > 39*   CREATININE mg/dL 1 57*   < > 2 22*   ANION GAP mmol/L 11   < > 13   CALCIUM mg/dL 9 1   < > 8 1*   ALBUMIN g/dL  --   --  2 7*   TOTAL BILIRUBIN mg/dL  --   --  1 48*   ALK PHOS U/L  --   --  64   ALT U/L  --   --  32   AST U/L  --   --  58*   GLUCOSE RANDOM mg/dL 191*   < > 232*    < > = values in this interval not displayed  Results from last 7 days   Lab Units 02/09/20  0023   INR  1 37*     Results from last 7 days   Lab Units 02/11/20  1600 02/11/20  1110 02/11/20  0729 02/10/20  2042 02/10/20  1634 02/10/20  1108 02/10/20  0706 02/09/20  2053 02/09/20  1743 02/09/20  1206 02/09/20  0601 02/06/20  1318   POC GLUCOSE mg/dl 238* 367* 204* 271* 280* 137 188* 259* 239* 177* 224* 153*         Results from last 7 days   Lab Units 02/11/20  0457 02/09/20  0556 02/09/20  0344 02/09/20  0203 02/08/20  2354   LACTIC ACID mmol/L  --   --   --  2 2* 3 0*   PROCALCITONIN ng/ml 5 70* 20 64* 21 34*  --  26 20*           * I Have Reviewed All Lab Data Listed Above  * Additional Pertinent Lab Tests Reviewed: [facility] 66 Admission Reviewed    Imaging:    Imaging Reports Reviewed Today Include: CT abdomen pelvis with contrast xr chest portable    Recent Cultures (last 7 days):     Results from last 7 days   Lab Units 02/09/20  0242 02/08/20  2355 02/08/20  2354   BLOOD CULTURE   --  No Growth at 48 hrs  No Growth at 48 hrs     URINE CULTURE  <10,000 cfu/ml   --   --        Last 24 Hours Medication List:     Current Facility-Administered Medications:  acetaminophen 975 mg Oral Q8H PRN DMITRY Rios    amiodarone 200 mg Oral TID With Meals DMITRY Rios    cefTRIAXone 2,000 mg Intravenous Q24H DMITRY Rios Last Rate: 2,000 mg (02/11/20 1240)   fluticasone 1 spray Each Nare Daily Sandralee Imam, CRNP    heparin (porcine) 5,000 Units Subcutaneous [facility] DMITRY Espinoza    insulin lispro 1-5 Units Subcutaneous HS DMITRY Espinoza    insulin lispro 1-6 Units Subcutaneous TID AC DMITRY Espinoza    melatonin 6 mg Oral HS Sandralee Imam, CRNP    metoprolol succinate 50 mg Oral Daily Sandralee Imam, CRNP    oxyCODONE-acetaminophen 1 tablet Oral Q4H PRN Sandralee Imam, CRNP    Or        oxyCODONE-acetaminophen 2 tablet Oral Q4H PRN Sandralee Imam, CRNP         Today, Patient Was Seen By: Jose Duff MD    ** Please Note: Dictation voice to text software may have been used in the creation of this document   **

## 2020-02-12 NOTE — NURSING NOTE
Patient had IV removed and all the instructions were reviewed  He was informed to buy a BP cuff and to make his follow ups  Patient walked to the lobby with his wife

## 2020-02-12 NOTE — PLAN OF CARE
Problem: Potential for Falls  Goal: Patient will remain free of falls  Description  INTERVENTIONS:  - Assess patient frequently for physical needs  -  Identify cognitive and physical deficits and behaviors that affect risk of falls    -  [place] fall precautions as indicated by assessment   - Educate patient/family on patient safety including physical limitations  - Instruct patient to call for assistance with activity based on assessment  - Modify environment to reduce risk of injury  - Consider OT/PT consult to assist with strengthening/mobility  Outcome: Progressing     Problem: PAIN - ADULT  Goal: Verbalizes/displays adequate comfort level or baseline comfort level  Description  Interventions:  - Encourage patient to monitor pain and request assistance  - Assess pain using appropriate pain scale  - Administer analgesics based on type and severity of pain and evaluate response  - Implement non-pharmacological measures as appropriate and evaluate response  - Consider cultural and social influences on pain and pain management  - Notify physician/advanced practitioner if interventions unsuccessful or patient reports new pain  Outcome: Progressing     Problem: INFECTION - ADULT  Goal: Absence or prevention of progression during hospitalization  Description  INTERVENTIONS:  - Assess and monitor for signs and symptoms of infection  - Monitor lab/diagnostic results  - Monitor all insertion sites, i e  indwelling lines, tubes, and drains  - Monitor endotracheal if appropriate and nasal secretions for changes in amount and color  - [place] appropriate cooling/warming therapies per order  - Administer medications as ordered  - Instruct and encourage patient and family to use good hand hygiene technique  - Identify and instruct in appropriate isolation precautions for identified infection/condition  Outcome: Progressing  Goal: Absence of fever/infection during neutropenic period  Description  INTERVENTIONS:  - Monitor WBC    Outcome: Progressing     Problem: SAFETY ADULT  Goal: Maintain or return to baseline ADL function  Description  INTERVENTIONS:  -  Assess patient's ability to carry out ADLs; assess patient's baseline for ADL function and identify physical deficits which impact ability to perform ADLs (bathing, care of mouth/teeth, toileting, grooming, dressing, etc )  - Assess/evaluate cause of self-care deficits   - Assess range of motion  - Assess patient's mobility; develop plan if impaired  - Assess patient's need for assistive devices and provide as appropriate  - Encourage maximum independence but intervene and supervise when necessary  - Involve family in performance of ADLs  - Assess for home care needs following discharge   - Consider OT consult to assist with ADL evaluation and planning for discharge  - Provide patient education as appropriate  Outcome: Progressing  Goal: Maintain or return mobility status to optimal level  Description  INTERVENTIONS:  - Assess patient's baseline mobility status (ambulation, transfers, stairs, etc )    - Identify cognitive and physical deficits and behaviors that affect mobility  - Identify mobility aids required to assist with transfers and/or ambulation (gait belt, sit-to-stand, lift, walker, cane, etc )  - [place] fall precautions as indicated by assessment  - Record patient progress and toleration of activity level on Mobility SBAR; progress patient to next Phase/Stage  - Instruct patient to call for assistance with activity based on assessment  - Consider rehabilitation consult to assist with strengthening/weightbearing, etc   Outcome: Progressing     Problem: DISCHARGE PLANNING  Goal: Discharge to home or other facility with appropriate resources  Description  INTERVENTIONS:  - Identify barriers to discharge w/patient and caregiver  - Arrange for needed discharge resources and transportation as appropriate  - Identify discharge learning needs (meds, wound care, etc )  - Arrange for interpretive services to assist at discharge as needed  - Refer to Case Management Department for coordinating discharge planning if the patient needs post-hospital services based on physician/advanced practitioner order or complex needs related to functional status, cognitive ability, or social support system  Outcome: Progressing     Problem: Knowledge Deficit  Goal: Patient/family/caregiver demonstrates understanding of disease process, treatment plan, medications, and discharge instructions  Description  Complete learning assessment and assess knowledge base    Interventions:  - Provide teaching at level of understanding  - Provide teaching via preferred learning methods  Outcome: Progressing     Problem: CARDIOVASCULAR - ADULT  Goal: Maintains optimal cardiac output and hemodynamic stability  Description  INTERVENTIONS:  - Monitor I/O, vital signs and rhythm  - Monitor for S/S and trends of decreased cardiac output  - Administer and titrate ordered vasoactive medications to optimize hemodynamic stability  - Assess quality of pulses, skin color and temperature  - Assess for signs of decreased coronary artery perfusion  - Instruct patient to report change in severity of symptoms  Outcome: Progressing  Goal: Absence of cardiac dysrhythmias or at baseline rhythm  Description  INTERVENTIONS:  - Continuous cardiac monitoring, vital signs, obtain 12 lead EKG if ordered  - Administer antiarrhythmic and heart rate control medications as ordered  - Monitor electrolytes and administer replacement therapy as ordered  Outcome: Progressing     Problem: GENITOURINARY - ADULT  Goal: Maintains or returns to baseline urinary function  Description  INTERVENTIONS:  - Assess urinary function  - Encourage oral fluids to ensure adequate hydration if ordered  - Administer IV fluids as ordered to ensure adequate hydration  - Administer ordered medications as needed  - Offer frequent toileting  - Follow urinary retention protocol if ordered  Outcome: Progressing  Goal: Absence of urinary retention  Description  INTERVENTIONS:  - Assess patients ability to void and empty bladder  - Monitor I/O  - Bladder scan as needed  - Discuss with physician/AP medications to alleviate retention as needed  - Discuss catheterization for long term situations as appropriate  Outcome: Progressing  Goal: Urinary catheter remains patent  Description  INTERVENTIONS:  - Assess patency of urinary catheter  - If patient has a chronic wilhelm, consider changing catheter if non-functioning  - Follow guidelines for intermittent irrigation of non-functioning urinary catheter  Outcome: Progressing     Problem: METABOLIC, FLUID AND ELECTROLYTES - ADULT  Goal: Electrolytes maintained within normal limits  Description  INTERVENTIONS:  - Monitor labs and assess patient for signs and symptoms of electrolyte imbalances  - Administer electrolyte replacement as ordered  - Monitor response to electrolyte replacements, including repeat lab results as appropriate  - Instruct patient on fluid and nutrition as appropriate  Outcome: Progressing  Goal: Fluid balance maintained  Description  INTERVENTIONS:  - Monitor labs   - Monitor I/O and WT  - Instruct patient on fluid and nutrition as appropriate  - Assess for signs & symptoms of volume excess or deficit  Outcome: Progressing  Goal: Glucose maintained within target range  Description  INTERVENTIONS:  - Monitor Blood Glucose as ordered  - Assess for signs and symptoms of hyperglycemia and hypoglycemia  - Administer ordered medications to maintain glucose within target range  - Assess nutritional intake and initiate nutrition service referral as needed  Outcome: Progressing

## 2020-02-12 NOTE — DISCHARGE SUMMARY
Discharge- Laruth Line [birth year], 71 y o  male MRN: [id number]    Unit/Bed#: E4 -01 Encounter: [id number]    Primary Care Provider: Berlin Bradley MD   Date and time admitted to hospital: 2/8/2020 11:14 PM        * Septic shock [facility])  Assessment & Plan  77-year-old male initially admitted due to septic shock as a result of urinary tract infection from lithiasis with stent placement  Cultures no growth to date  - Discontinued IV antibiotic therapy and will discharge him with PO antibiotics        Cardiac arrest with successful resuscitation [facility])  Assessment & Plan  Cardiac arrest with successful resuscitation  - Appreciate Cardiology recommendations    UTI (urinary tract infection)  Assessment & Plan  Treatment plan written above    BPH without obstruction/lower urinary tract symptoms  Assessment & Plan  Continue Flomax    Kidney stone  Assessment & Plan  As per urology: "S/p cystourethroscopy with laser of bladder stones, ureteroscopy with laser lithotripsy of left upper ureteral stones"  - Outpaitient urology follow-up with K[facility]    DAVINA (acute kidney injury) [facility])  Assessment & Plan  Improving  Close to baseline  Outpatient PCP follow-up within this week with repeat BMP    Recent Labs     02/10/20  0500 02/11/20  0456   BUN 37* 32*   CREATININE 1 78* 1 57*         Paroxysmal atrial fibrillation [facility])  Assessment & Plan  Appreciate cardiology recommendations  CHADSVASC2 of 3, but due to brevity of atrial fibrillation in the setting of sepsis cardiology would like to hold off anticoagulation for now until he is reevaluated in their clinic  Nuclear stress test: no perfusion defects  Echo reviewed  · Discharge patient with amiodarone 200mg TID until 2/16, then decrease to 200mg a day  · Outpatient cardiology followup        Discharging Physician / Practitioner: Angel Apodaca MD  PCP: Berlin Bradley MD  Admission Date:   Admission Orders (From admission, onward)     Ordered        02/09/20 0204 Inpatient Admission  Once                   Discharge Date: 02/12/20    Resolved Problems  Date Reviewed: 2/12/2020    None          Consultations During Hospital Stay:  · Urology, cardiology    Procedures Performed:   · CPR    Significant Findings / Test Results:   · Transthoracic Echo    LEFT VENTRICLE:  Systolic function was at the lower limits of normal  Ejection fraction was estimated to be 45 %  There was mild diffuse hypokinesis  Doppler parameters were consistent with abnormal left ventricular relaxation (grade 1 diastolic dysfunction)      LEFT ATRIUM:  The atrium was mildly dilated      RIGHT ATRIUM:  The atrium was mildly dilated      MITRAL VALVE:  There was mild annular calcification      AORTIC VALVE:  There was trace regurgitation      TRICUSPID VALVE:  There was trace regurgitation      AORTA:  The root exhibited mild dilatation, measuring upto 4 5 cm (1 9 cm/m2) at the proximal ascending aorta      PULMONARY ARTERIES:  Systolic pressure was mildly increased  Estimated peak pressure was 35 mmHg  · NM myocardial perfusion spect    -  Stress results: There was no chest pain during stress  -  ECG conclusions: The stress ECG was negative for ischemia and normal   -  Perfusion imaging: There were no perfusion defects   -  Gated SPECT: The calculated left ventricular ejection fraction was low normal 42 %  Left ventricular ejection fraction was low normal by visual estimate      IMPRESSIONS: Normal study after pharmacologic vasodilation  Myocardial perfusion imaging was normal at rest and with stress  Left ventricular systolic function was low normal     · XR Chest    Mild cardiomegaly and uncoiling of the aorta with a shallow inspiratory effort      Hazy patchy opacities at the left base likely represent subsegmental atelectasis  No dense consolidation    No pneumothorax or pleural effusion      Osseous structures appear within normal limits for patient age      IMPRESSION:     Shallow inspiratory effort mild accentuation of the cardiomediastinal silhouette      Subsegmental atelectasis at the left base      · CTA Chest CT abdomen pelvis    CHEST     PULMONARY ARTERIAL TREE:  No pulmonary embolus is seen      LUNGS:  The trachea and central bronchial tree are patent  Atelectasis seen within the lung bases      PLEURA:  Unremarkable      HEART/AORTA:  Unremarkable for patient's age      MEDIASTINUM AND ERICA:  Unremarkable      CHEST WALL AND LOWER NECK:   Unremarkable      ABDOMEN     LIVER/BILIARY TREE:  One or more subcentimeter sharply circumscribed low-density hepatic lesion(s) are noted, too small to accurately characterize, but statistically most likely to represent subcentimeter hepatic cysts  No suspicious solid hepatic   lesion is identified  Hepatic contours are normal   No biliary dilatation      GALLBLADDER:  No calcified gallstones  No pericholecystic inflammatory change      SPLEEN:  Unremarkable      PANCREAS:  Unremarkable      ADRENAL GLANDS:  Unremarkable      KIDNEYS/URETERS:  Left-sided ureteral stent is seen with its tip in the distal ureter  Proximal portion of the stent is seen coiled within the renal pelvis  Tiny droplet of air are seen within the left renal collecting system  Mild left-sided   perinephric stranding is seen  Cyst in the left kidney are visualized  Nonobstructing bilateral intrarenal calculi are seen      STOMACH AND BOWEL:  There is colonic diverticulosis without evidence of acute diverticulitis      APPENDIX:  No findings to suggest appendicitis      ABDOMINOPELVIC CAVITY:  Subcentimeter retroperitoneal and periaortic lymph nodes are visualized      VESSELS:  Atherosclerotic changes are present  No evidence of aneurysm    Ascending aorta measures up to 4 4 cm     PELVIS     REPRODUCTIVE ORGANS:  Unremarkable for patient's age      URINARY BLADDER:  Tiny droplets of air are seen within the urinary bladder      ABDOMINAL WALL/INGUINAL REGIONS: and grade 1 diastolic dysfunction  Nuclear stress test did not show any perfusion defects  He will continue amiodarone 200 mg p o  T i d  then switch to 200 mg daily on February 16  [place] Vasc score is 3, by Cardiology decided that given the brevity of the atrial fibrillation in the setting of sepsis it is best to hold off anticoagulation for now  They will arrange his outpatient follow-up appointment for loop recorder implantation evaluate his atrial fibrillation burden and VT further  Please see above list of diagnoses and related plan for additional information  Patient agrees to follow-up with his providers as scheduled and to take his medications as prescribed  All questions were addressed  Patient also understood the need to seek immediate medical attention should any chest pain, shortness of breath, severe pain, fever, chills, or any other concerning symptoms develop  Condition at Discharge: fair     Discharge Day Visit / Exam:     Subjective:  Patient seen and examined at bedside  No acute events or complaints overnight  Cardiology neurology cleared patient for discharge  Patient agrees to follow up in takes medications as scheduled  Vitals: Blood Pressure: 142/86 (02/12/20 0713)  Pulse: 57 (02/12/20 0713)  Temperature: 97 8 °F (36 6 °C) (02/12/20 0713)  Temp Source: Temporal (02/12/20 0713)  Respirations: 20 (02/12/20 0713)  Height: 6' 1" (185 4 cm) (02/09/20 1023)  Weight - Scale: 109 kg (239 lb 10 2 oz) (02/12/20 0600)  SpO2: 96 % (02/12/20 0713)  Exam:   Physical Exam   Constitutional: No distress  HENT:   Head: Normocephalic and atraumatic  Eyes: Pupils are equal, round, and reactive to light  EOM are normal    Neck: Normal range of motion  Neck supple  Cardiovascular: Normal rate and regular rhythm  Pulmonary/Chest: Effort normal and breath sounds normal    Abdominal: Soft  Bowel sounds are normal    Musculoskeletal: Normal range of motion  Neurological: He is alert  Skin: Skin is warm and dry  Psychiatric: He has a normal mood and affect  Vitals reviewed  Discussion with Family: Wife    Discharge instructions/Information to patient and family:   See after visit summary for information provided to patient and family  Provisions for Follow-Up Care:  See after visit summary for information related to follow-up care and any pertinent home health orders  Disposition:     Home    For Discharges to [facility]:   · Not Applicable to this Patient - Not Applicable to this Patient    Planned Readmission: No     Discharge Statement:  I spent 49 minutes discharging the patient  This time was spent on the day of discharge  I had direct contact with the patient on the day of discharge  Greater than 50% of the total time was spent examining patient, answering all patient questions, arranging and discussing plan of care with patient as well as directly providing post-discharge instructions  Additional time then spent on discharge activities  Discharge Medications:  See after visit summary for reconciled discharge medications provided to patient and family        ** Please Note: This note has been constructed using a voice recognition system **

## 2020-02-12 NOTE — DISCHARGE INSTRUCTIONS
A-fib (Atrial Fibrillation)   WHAT YOU NEED TO KNOW:   A-fib may come and go, or it may be a long-term condition  A-fib can cause blood clots, stroke, or heart failure  These conditions may become life-threatening  It is important to treat and manage a-fib to help prevent a blood clot, stroke, or heart failure  DISCHARGE INSTRUCTIONS:   Call 911 for any of the following:   · You have any of the following signs of a heart attack:      ¨ Squeezing, pressure, or pain in your chest that lasts longer than 5 minutes or returns    ¨ Discomfort or pain in your back, neck, jaw, stomach, or arm     ¨ Trouble breathing    ¨ Nausea or vomiting    ¨ Lightheadedness or a sudden cold sweat, especially with chest pain or trouble breathing    · You have any of the following signs of a stroke:      ¨ Numbness or drooping on one side of your face     ¨ Weakness in an arm or leg    ¨ Confusion or difficulty speaking    ¨ Dizziness, a severe headache, or vision loss  Seek care immediately if:  You have any of the following signs of a blood clot:  · You feel lightheaded, are short of breath, and have chest pain  · You cough up blood  · You have swelling, redness, pain, or warmth in your arm or leg  Contact your cardiologist or healthcare provider if:   · Your heart rate is higher than your healthcare provider said it should be  · You have new or worsening swelling in your legs, feet, ankles, or abdomen  · You are short of breath, even at rest      · You have questions or concerns about your condition or care  Medicines: You may need any of the following:  · Heart medicines  help control your heart rate and rhythm  You may need more than one medicine to treat your symptoms  · Blood thinners    help prevent blood clots  Examples of blood thinners include heparin and warfarin  Clots can cause strokes, heart attacks, and death   The following are general safety guidelines to follow while you are taking a blood thinner:    ¨ Watch for bleeding and bruising while you take blood thinners  Watch for bleeding from your gums or nose  Watch for blood in your urine and bowel movements  Use a soft washcloth on your skin, and a soft toothbrush to brush your teeth  This can keep your skin and gums from bleeding  If you shave, use an electric shaver  Do not play contact sports  ¨ Tell your dentist and other healthcare providers that you take anticoagulants  Wear a bracelet or necklace that says you take this medicine  ¨ Do not start or stop any medicines unless your healthcare provider tells you to  Many medicines cannot be used with blood thinners  ¨ Tell your healthcare provider right away if you forget to take the medicine, or if you take too much  ¨ Warfarin  is a blood thinner that you may need to take  The following are things you should be aware of if you take warfarin  § Foods and medicines can affect the amount of warfarin in your blood  Do not make major changes to your diet while you take warfarin  Warfarin works best when you eat about the same amount of vitamin K every day  Vitamin K is found in green leafy vegetables and certain other foods  Ask for more information about what to eat when you are taking warfarin  § You will need to see your healthcare provider for follow-up visits when you are on warfarin  You will need regular blood tests  These tests are used to decide how much medicine you need  · Antiplatelets , such as aspirin, help prevent blood clots  Take your antiplatelet medicine exactly as directed  These medicines make it more likely for you to bleed or bruise  If you are told to take aspirin, do not take acetaminophen or ibuprofen instead  · Take your medicine as directed  Contact your healthcare provider if you think your medicine is not helping or if you have side effects  Tell him or her if you are allergic to any medicine   Keep a list of the medicines, vitamins, and herbs you take  Include the amounts, and when and why you take them  Bring the list or the pill bottles to follow-up visits  Carry your medicine list with you in case of an emergency  Follow up with your cardiologist as directed: You will need regular blood tests and monitoring  Write down your questions so you remember to ask them during your visits  Manage A-fib:   · Know your target heart rate  Learn how to take your pulse and monitor your heart rate  · Manage other health conditions  This includes high blood pressure, sleep apnea, thyroid disease, diabetes, and other heart conditions  Take medicine as directed and follow your treatment plan  · Limit or do not drink alcohol  Alcohol can make a-fib hard to manage  Ask your healthcare provider if it is safe for you to drink alcohol  A drink of alcohol is 12 ounces of beer, 5 ounces of wine, or 1½ ounces of liquor  · Do not smoke  Nicotine and other chemicals in cigarettes and cigars can cause heart and lung damage  Ask your healthcare provider for information if you currently smoke and need help to quit  E-cigarettes or smokeless tobacco still contain nicotine  Talk to your healthcare provider before you use these products  · Eat heart-healthy foods  Heart healthy foods will help keep your cholesterol low  These include fruits, vegetables, whole-grain breads, low-fat dairy products, beans, lean meats, and fish  Replace butter and margarine with heart-healthy oils such as olive oil and canola oil  · Maintain a healthy weight  Ask your healthcare provider how much you should weigh  Ask him to help you create a weight loss plan if you are overweight  · Exercise for 30 minutes  most days of the week  Ask your healthcare provider about the best exercise plan for you  © 2017 [employer] Information is for End User's use only and may not be sold, redistributed or otherwise used for commercial purposes   All illustrations and images included in CareNotes® are the copyrighted property of [employer]  or [employer]  The above information is an  only  It is not intended as medical advice for individual conditions or treatments  Talk to your doctor, nurse or pharmacist before following any medical regimen to see if it is safe and effective for you  Urinary Tract Infection in Men   WHAT YOU NEED TO KNOW:   A urinary tract infection (UTI) is caused by bacteria that get inside your urinary tract  Most bacteria that enter your urinary tract come out when you urinate  If the bacteria stay in your urinary tract, you may get an infection  Your urinary tract includes your kidneys, ureters, bladder, and urethra  Urine is made in your kidneys, and it flows from the ureters to the bladder  Urine leaves the bladder through the urethra  A UTI is more common in your lower urinary tract, which includes your bladder and urethra  DISCHARGE INSTRUCTIONS:   Seek care immediately if:   · You are urinating very little or not at all  · You have a high fever with shaking chills  · You have side or back pain that gets worse  Contact your healthcare provider if:   · You have a mild fever  · You do not feel better after 2 days of taking antibiotics  · You are vomiting  · You have new symptoms, such as blood or pus in your urine  · You have questions or concerns about your condition or care  Medicines:   · Antibiotics  help fight a bacterial infection  · Medicines  may be given to decrease pain and burning when you urinate  They will also help decrease the feeling that you need to urinate often  These medicines will make your urine orange or red  · Take your medicine as directed  Contact your healthcare provider if you think your medicine is not helping or if you have side effects  Tell him or her if you are allergic to any medicine   Keep a list of the medicines, vitamins, and herbs you take  Include the amounts, and when and why you take them  Bring the list or the pill bottles to follow-up visits  Carry your medicine list with you in case of an emergency  Prevent another UTI:   · Empty your bladder often  Urinate and empty your bladder as soon as you feel the need  Do not hold your urine for long periods of time  · Drink liquids as directed  Ask how much liquid to drink each day and which liquids are best for you  You may need to drink more liquids than usual to help flush out the bacteria  Do not drink alcohol, caffeine, or citrus juices  These can irritate your bladder and increase your symptoms  Your healthcare provider may recommend cranberry juice to help prevent a UTI  · Urinate after you have sex  This can help flush out bacteria passed during sex  · Do pelvic muscle exercises often  Pelvic muscle exercises may help you start and stop urinating  Strong pelvic muscles may help you empty your bladder easier  Squeeze these muscles tightly for 5 seconds like you are trying to hold back urine  Then relax for 5 seconds  Gradually work up to squeezing for 10 seconds  Do 3 sets of 15 repetitions a day, or as directed  Follow up with your healthcare provider as directed:  Write down your questions so you remember to ask them during your visits  © 2017 [employer] Information is for End User's use only and may not be sold, redistributed or otherwise used for commercial purposes  All illustrations and images included in CareNotes® are the copyrighted property of [employer]  or [employer]  The above information is an  only  It is not intended as medical advice for individual conditions or treatments  Talk to your doctor, nurse or pharmacist before following any medical regimen to see if it is safe and effective for you

## 2020-02-12 NOTE — ASSESSMENT & PLAN NOTE
Improving    Recent Labs     02/09/20  0556 02/10/20  0500 02/11/20  0456   BUN 39* 37* 32*   CREATININE 2 11* 1 78* 1 57*

## 2020-02-12 NOTE — ASSESSMENT & PLAN NOTE
Appreciate cardiology recommendations  CHADSVASC2 of 3, but due to brevity of atrial fibrillation in the setting of sepsis cardiology would like to hold off anticoagulation for now until he is reevaluated in their clinic  Nuclear stress test: no perfusion defects  Echo reviewed  · Discharge patient with amiodarone 200mg TID until 2/16, then decrease to 200mg a day  · Outpatient cardiology followup

## 2020-02-13 ENCOUNTER — TELEPHONE (OUTPATIENT)
Dept: CARDIOLOGY CLINIC | Facility: CLINIC | Age: 70
End: 2020-02-13

## 2020-02-13 NOTE — TELEPHONE ENCOUNTER
Jason Honeycutt, you saw this pt in the hospital and requested for this pt to have a loop implant  The pt has [facility] and the pt will need a event monitor first     Would you like the pt to get an event monitor? Could you put the order in? And how long?

## 2020-02-13 NOTE — TELEPHONE ENCOUNTER
Melia Hernández,     Dr Nanda Schuler is requesting a 30-day MCOT for this patient  I will make sure the order is in  He is [facility], can you let me know if he requires auth? Thanks

## 2020-02-14 LAB
BACTERIA BLD CULT: NORMAL
BACTERIA BLD CULT: NORMAL

## 2020-02-20 DIAGNOSIS — I47.2 VENTRICULAR TACHYCARDIA (HCC): Primary | ICD-10-CM

## 2020-02-20 LAB
CALCIUM OXALATE DIHYDRATE MFR STONE IR: 90 %
COLOR STONE: NORMAL
COM MFR STONE: 10 %
COMMENT-STONE3: NORMAL
COMPOSITION: NORMAL
LABORATORY COMMENT REPORT: NORMAL
PHOTO: NORMAL
SIZE STONE: NORMAL MM
SPEC SOURCE SUBJ: NORMAL
STONE ANALYSIS-IMP: NORMAL
WT STONE: 1112.8 MG

## 2020-02-21 NOTE — TELEPHONE ENCOUNTER
Looks like pt is scheduled for an OV with Dr Jenny Jimenez on 02/28 maybe we can placed the patch on that day

## 2020-02-25 ENCOUNTER — PROCEDURE VISIT (OUTPATIENT)
Dept: UROLOGY | Facility: MEDICAL CENTER | Age: 70
End: 2020-02-25
Payer: COMMERCIAL

## 2020-02-25 VITALS
HEIGHT: 73 IN | HEART RATE: 61 BPM | BODY MASS INDEX: 31.62 KG/M2 | SYSTOLIC BLOOD PRESSURE: 128 MMHG | DIASTOLIC BLOOD PRESSURE: 82 MMHG

## 2020-02-25 DIAGNOSIS — N20.0 KIDNEY STONE: ICD-10-CM

## 2020-02-25 DIAGNOSIS — Z46.6 ENCOUNTER FOR REMOVAL OF URETERAL STENT: Primary | ICD-10-CM

## 2020-02-25 DIAGNOSIS — N21.0 BLADDER STONES: ICD-10-CM

## 2020-02-25 DIAGNOSIS — N40.0 BPH WITHOUT OBSTRUCTION/LOWER URINARY TRACT SYMPTOMS: ICD-10-CM

## 2020-02-25 PROCEDURE — 99214 OFFICE O/P EST MOD 30 MIN: CPT | Performed by: UROLOGY

## 2020-02-25 PROCEDURE — 52310 CYSTOSCOPY AND TREATMENT: CPT | Performed by: UROLOGY

## 2020-02-25 RX ORDER — CLINDAMYCIN HYDROCHLORIDE 300 MG/1
CAPSULE ORAL
COMMUNITY
Start: 2020-02-17

## 2020-02-25 NOTE — PROGRESS NOTES
Cystoscopy  Date/Time: 2/25/2020 9:24 AM  Performed by: Brandon Landis MD  Authorized by: Brandon Landis MD     Procedure details: simple removal of a foreign body, stone, or stent        The patient was placed supine on the procedure table, and prepped and draped in the penoscrotal area in the usual manner  A lubricated 16 Israeli flexible cystoscope inserted per urethra, where there is no evidence of strictures or lesions seen  The bladder was entered and the string to the stent was visualized and grasped and the attached stent was extracted with removal of the cystoscope  He tolerated procedure well

## 2020-02-25 NOTE — PROGRESS NOTES
Assessment/Plan:      Diagnoses and all orders for this visit:    Encounter for removal of ureteral stent  -     Cystoscopy    Kidney stone    Bladder stones    BPH without obstruction/lower urinary tract symptoms    Other orders  -     clindamycin (CLEOCIN) 300 MG capsule          Subjective:  No complaints     Patient ID: Irasema Galvan is a 71 y o  male  HPI  About 2-1/2 weeks post cystoscopy with laser of bladder stones and ureteroscopy with laser lithotripsy/right stent placement who notes he initially did well postoperatively but then began to feel flu-like  He had chills, nausea/vomiting and urinary frequency  About 3 days postprocedure he passed out and was brought to the emergency room where he had a cardiac arrest with successful resuscitation  He was subsequently discharged, and the present time he feels well  He has no flank pain  He notes he is voiding well at this time  He has an indwelling double-J stent for removal today  Review of Systems   Constitutional: Negative  Negative for fever  HENT: Negative  Eyes: Negative  Respiratory: Negative  Cardiovascular: Negative  Gastrointestinal: Negative  Endocrine: Negative  Genitourinary: Positive for frequency and urgency  Negative for flank pain  Musculoskeletal: Negative  Skin: Negative  Allergic/Immunologic: Negative  Neurological: Negative  Hematological: Negative  Psychiatric/Behavioral: Negative  Objective:     Physical Exam   Constitutional: He is oriented to person, place, and time  He appears well-developed and well-nourished  No distress  HENT:   Head: Normocephalic and atraumatic  Nose: Nose normal    Mouth/Throat: Oropharynx is clear and moist    Eyes: Pupils are equal, round, and reactive to light  Conjunctivae and EOM are normal  No scleral icterus  Neck: Normal range of motion  Neck supple  Cardiovascular: Normal rate, regular rhythm, normal heart sounds and intact distal pulses  No murmur heard  Pulmonary/Chest: Effort normal and breath sounds normal  No respiratory distress  He has no wheezes  He has no rales  Abdominal: Soft  Bowel sounds are normal  He exhibits no distension and no mass  There is no tenderness  Musculoskeletal: Normal range of motion  He exhibits no edema or tenderness  Lymphadenopathy:     He has no cervical adenopathy  Neurological: He is alert and oriented to person, place, and time  No cranial nerve deficit  Skin: Skin is warm and dry  No rash noted  No erythema  No pallor  Psychiatric: He has a normal mood and affect  His behavior is normal  Judgment and thought content normal    Nursing note and vitals reviewed        Refer to cystoscopy procedure note

## 2020-02-25 NOTE — LETTER
February 25, 2020     MD Ulysses Sanchez Maria Victoriasherwin Shitalabrahan [phone]    Patient: Eb Vasquez   YOB: 1950   Date of Visit: 2/25/2020       Dear Dr Clovis Wheeler: Thank you for referring Eb Vasquez to me for evaluation  Below are my notes for this consultation  If you have questions, please do not hesitate to call me  I look forward to following your patient along with you           Sincerely,        Felicia Freeman MD        CC: Blanca Gutierrez MD

## 2020-03-15 DIAGNOSIS — I48.0 PAROXYSMAL ATRIAL FIBRILLATION (HCC): ICD-10-CM

## 2020-03-17 ENCOUNTER — OFFICE VISIT (OUTPATIENT)
Dept: CARDIOLOGY CLINIC | Facility: CLINIC | Age: 70
End: 2020-03-17
Payer: COMMERCIAL

## 2020-03-17 ENCOUNTER — CLINICAL SUPPORT (OUTPATIENT)
Dept: CARDIOLOGY CLINIC | Facility: CLINIC | Age: 70
End: 2020-03-17
Payer: COMMERCIAL

## 2020-03-17 VITALS
SYSTOLIC BLOOD PRESSURE: 164 MMHG | RESPIRATION RATE: 16 BRPM | HEIGHT: 73 IN | DIASTOLIC BLOOD PRESSURE: 98 MMHG | BODY MASS INDEX: 31.07 KG/M2 | WEIGHT: 234.4 LBS | HEART RATE: 69 BPM

## 2020-03-17 DIAGNOSIS — I47.2 V-TACH (HCC): Primary | ICD-10-CM

## 2020-03-17 DIAGNOSIS — I48.0 PAROXYSMAL ATRIAL FIBRILLATION (HCC): ICD-10-CM

## 2020-03-17 DIAGNOSIS — I46.9 CARDIAC ARREST WITH SUCCESSFUL RESUSCITATION (HCC): Primary | ICD-10-CM

## 2020-03-17 PROCEDURE — 93000 ELECTROCARDIOGRAM COMPLETE: CPT | Performed by: INTERNAL MEDICINE

## 2020-03-17 PROCEDURE — 99243 OFF/OP CNSLTJ NEW/EST LOW 30: CPT | Performed by: INTERNAL MEDICINE

## 2020-03-17 PROCEDURE — NC001 PR NO CHARGE

## 2020-03-17 RX ORDER — METOPROLOL SUCCINATE 50 MG/1
50 TABLET, EXTENDED RELEASE ORAL DAILY
Qty: 90 TABLET | Refills: 3 | Status: SHIPPED | OUTPATIENT
Start: 2020-03-17 | End: 2021-01-01

## 2020-03-17 RX ORDER — METOPROLOL SUCCINATE 50 MG/1
TABLET, EXTENDED RELEASE ORAL
Qty: 30 TABLET | Refills: 0 | OUTPATIENT
Start: 2020-03-17

## 2020-03-17 RX ORDER — AMLODIPINE BESYLATE AND BENAZEPRIL HYDROCHLORIDE 5; 10 MG/1; MG/1
1 CAPSULE ORAL DAILY
Qty: 90 CAPSULE | Refills: 3 | Status: SHIPPED | OUTPATIENT
Start: 2020-03-17 | End: 2021-03-01

## 2020-03-17 NOTE — PATIENT INSTRUCTIONS
1  Stop amiodarone    2  Wear 30 day monitor    3  Obtain echocardiogram in June 4   Follow-up after your Echocardiogram

## 2020-03-17 NOTE — PROGRESS NOTES
EPS Progress Note - Erick Martinez 79 y o  male MRN: [id number]           ASSESSMENT/PLAN:  1  Paroxysmal atrial fibrillation  -Noted in the ER in setting of sepsis  LVEF 45% from echo in February in 2020   On Amiodarone 200 mg three times daily, Metoprolol 50 mg daily, and anticoagulated with ASA 81 mg daily   No evidence of atrial fibrillation outside   - Discontinue Amiodarone   - Obtain 30 day event monitor to assess burden  - Repeat echocardiogram in June 2  Hypertension  Followed by Dr Michelle Menjivar in the past, he use to be on Amlodipine/Lisinopril however he was told to discontinue it about 1 5 months ago  Restart Lotrel 5-10 mg daily     3  S/p cystoscopy and ureteroscopy with laser lithotripsy/right stent placement   - Sepsis from stent infection  - s/p removal   - has kidney stone on the left side     4  VT  - AFIB converted to VT in setting of sepsis  S/p nuclear stress test, founded normal in February 2020  EF reduced but no ischemia   Tolerating statin  Continue metoprolol     5  Non-ischemic cardiomyopathy  - no signs of fluid overload  - NYHA class 1  - continue metoprolol  Starting lisinopril     6  Syncope  - Occurred in setting of sepsis  - will obtain OT 30 day monitor    Follow up in 4 months after echo    HPI:   Erick Martinez is a 79 y o  male with diabetes type 2, hypertension, hyperlipidemia and kidney stones who presents to the office today for a hospital follow up  He [facility] to [facility] Feb 2020 after having 2 days of nausea, chills, vomiting and urinary frequency  Per wife patient was getting ready to go to the emergency room  She set him up and patient slumped over and syncopized  This lasted only for a minute or 2 before patient regained consciousness  Patient felt dizziness while lying supine prior to having the syncopal episode    He did not feel any chest pain, palpitations, blurry vision or numbness tingling prior to the episode       Per records when he presented to the emergency room patient developed rapid atrial fibrillation in the 180s with hypotension that was followed by ventricle tachycardia  ECG or telemetry strips are not available to review from this episode  Patient had CPR with defibrillation leading to return of spontaneous circulation  He was given amiodarone bolus and placed on amiodarone drip  He was being prepared for intubation in the setting of bradycardia and apnea but he woke up with full neurological recovery and intubation was aborted       Patient's blood pressure personally remained low despite fluid resuscitation  He was placed on IV pressors with Levophed infusion  His lab work showed white blood cell count of 20 and lactic acidosis of 3 with positive urinary analysis  He had ureter stent placed on February 6 for kidney stone  He was treated for urosepsis in the ICU  He subsequently underwent stent removal       Patient denied having any cardiac issues in the past         Patient reports doing well since being discharged from the hospital  He denies any chest pain, palpitations, shortness of breath, LE edema, pre syncope, or syncope  He does admit to not taking Amiodarone or Metoprolol for about one week due to needing refills  He has been noticing some fatigue and left sided chest discomfort which he relates it to receiving chest compressions  ROS:   Review of Systems   Constitution: Positive for malaise/fatigue  HENT: Negative  Eyes: Negative for blurred vision and double vision  Cardiovascular: Negative for chest pain, dyspnea on exertion, near-syncope, palpitations and syncope  Respiratory: Negative for cough, shortness of breath and wheezing  Endocrine: Negative  Hematologic/Lymphatic: Negative  Skin: Negative  Musculoskeletal: Negative  Gastrointestinal: Negative  Genitourinary: Negative  Neurological: Negative  Psychiatric/Behavioral: Negative  Allergic/Immunologic: Negative  Objective:     Vitals: Blood pressure 164/98, pulse 69, resp  rate 16, height 6' 1" (1 854 m), weight 106 kg (234 lb 6 4 oz)  , Body mass index is 30 93 kg/m²  ,        Physical Exam:    GEN: Keaton Traore appears well, alert and oriented x 3, pleasant and cooperative   HEENT: pupils equal, round, and reactive to light; extraocular muscles intact  NECK: supple, no carotid bruits   HEART: regular rhythm, normal S1 and S2, no murmurs, clicks, gallops or rubs   LUNGS: clear to auscultation bilaterally; no wheezes, rales, or rhonchi   ABDOMEN: normal bowel sounds, soft, no tenderness, no distention  EXTREMITIES: peripheral pulses normal; no clubbing, cyanosis, or edema  NEURO: no focal findings   SKIN: normal without suspicious lesions on exposed skin    Medications:      Current Outpatient Medications:     aspirin (ECOTRIN LOW STRENGTH) 81 mg EC tablet, Take 81 mg by mouth daily, Disp: , Rfl:     clindamycin (CLEOCIN) 300 MG capsule, , Disp: , Rfl:     Glucosamine HCl (GLUCOSAMINE PO), Take 1 tablet by mouth daily, Disp: , Rfl:     metFORMIN (GLUCOPHAGE-XR) 500 mg 24 hr tablet, Take 1,000 mg by mouth 2 (two) times a day, Disp: , Rfl: 3    Multiple Vitamin (MULTIVITAMIN) tablet, Take 1 tablet by mouth daily, Disp: , Rfl:     pravastatin (PRAVACHOL) 40 mg tablet, Take 40 mg by mouth daily, Disp: , Rfl: 3    Saw Palmetto, Serenoa repens, (SAW PALMETTO BERRY PO), Take 1 tablet by mouth daily, Disp: , Rfl:     amLODIPine-benazepril (LOTREL 5-10) 5-10 MG per capsule, Take 1 capsule by mouth daily, Disp: 90 capsule, Rfl: 3    metoprolol succinate (TOPROL-XL) 50 mg 24 hr tablet, Take 1 tablet (50 mg total) by mouth daily, Disp: 90 tablet, Rfl: 3    oxyCODONE (ROXICODONE) 5 mg immediate release tablet, Take 1-2 tablets every 6 hours prn (Patient not taking: Reported on 3/17/2020), Disp: 8 tablet, Rfl: 0    tamsulosin (FLOMAX) 0 4 mg, TAKE ONE CAPSULE BY MOUTH IN THE EVENING (Patient not taking: Reported on 3/17/2020), Disp: 30 capsule, Rfl: 0     No family history on file    Social History     Socioeconomic History    Marital status: /Civil Union     Spouse name: Not on file    Number of children: Not on file    Years of education: Not on file    Highest education level: Not on file   Occupational History    Not on file   Social Needs    Financial resource strain: Not on file    Food insecurity:     Worry: Not on file     Inability: Not on file    Transportation needs:     Medical: Not on file     Non-medical: Not on file   Tobacco Use    Smoking status: Former Smoker     Years: 20 00     Last attempt to quit: 2/4/2010     Years since quitting: 10 1    Smokeless tobacco: Never Used   Substance and Sexual Activity    Alcohol use: Never     Frequency: Never    Drug use: Never    Sexual activity: Not on file   Lifestyle    Physical activity:     Days per week: Not on file     Minutes per session: Not on file    Stress: Not on file   Relationships    Social connections:     Talks on phone: Not on file     Gets together: Not on file     Attends Mandaeism service: Not on file     Active member of club or organization: Not on file     Attends meetings of clubs or organizations: Not on file     Relationship status: Not on file    Intimate partner violence:     Fear of current or ex partner: Not on file     Emotionally abused: Not on file     Physically abused: Not on file     Forced sexual activity: Not on file   Other Topics Concern    Not on file   Social History Narrative    Not on file     Social History     Tobacco Use   Smoking Status Former Smoker    Years: 20 00    Last attempt to quit: 2/4/2010    Years since quitting: 10 1   Smokeless Tobacco Never Used     Social History     Substance and Sexual Activity   Alcohol Use Never    Frequency: Never       Labs & Results:  Below is the patient's most recent value for Albumin, ALT, AST, BUN, Calcium, Chloride, Cholesterol, CO2, Creatinine, GFR, Glucose, HDL, Hematocrit, Hemoglobin, Hemoglobin A1C, LDL, Magnesium, Phosphorus, Platelets, Potassium, PSA, Sodium, Triglycerides, and WBC  Lab Results   Component Value Date    ALT 32 2020    AST 58 (H) 2020    BUN 32 (H) 2020    CALCIUM 9 1 2020     2020    CO2 26 2020    CREATININE 1 57 (H) 2020    HDL 13 (L) 2020    HCT 42 2 2020    HGB 13 4 2020    HGBA1C 7 7 2019    MG 2 4 02/10/2020    PHOS 3 8 2020     2020    K 3 9 2020    TRIG 178 (H) 2020    WBC 10 77 (H) 2020     Note: for a comprehensive list of the patient's lab results, access the Results Review activity  Cardiac testing:   ECG performed in the office and reviewed by myself reveals normal sinus rhythm with occasional PVC  Results for orders placed during the hospital encounter of 20   Echo complete with contrast if indicated    Narrative [address]  [address]  [place], [address]  [phone]    Transthoracic Echocardiogram  2D, M-mode, Doppler, and Color Doppler    Study date:  10-Feb-2020    Patient: Jorge Alberto Cantu  MR number: [id number]  Account number: [de-identified]  : 1950  Age: 71 years  Gender: Male  Status: Inpatient  Location: Bedside  Height: 73 in  Weight: 234 5 lb  BP: 116/ 80 mmHg    Indications: Cardiac arrest  Assess left ventricular function  Diagnoses: I46 9 - Cardiac arrest, cause unspecified    Sonographer:  RAJANI Cunningham  Referring Physician:  Tony Mallory NP  Group:  [facility]  Interpreting Physician:  Martin Lilly DO    SUMMARY    LEFT VENTRICLE:  Systolic function was at the lower limits of normal  Ejection fraction was estimated to be 45 %  There was mild diffuse hypokinesis  Doppler parameters were consistent with abnormal left ventricular relaxation (grade 1 diastolic dysfunction)      LEFT ATRIUM:  The atrium was mildly dilated  RIGHT ATRIUM:  The atrium was mildly dilated  MITRAL VALVE:  There was mild annular calcification  AORTIC VALVE:  There was trace regurgitation  TRICUSPID VALVE:  There was trace regurgitation  AORTA:  The root exhibited mild dilatation, measuring upto 4 5 cm (1 9 cm/m2) at the proximal ascending aorta  PULMONARY ARTERIES:  Systolic pressure was mildly increased  Estimated peak pressure was 35 mmHg  SUMMARY MEASUREMENTS  2D measurements:  Unspecified Anatomy:   LAESV Index (A-L) was 44 2 ml/m2  LAESVInd MOD BP was 41 6 ml/m2  RWT was 0 4    CW measurements:  Unspecified Anatomy:   TR Vmax was 3 1 m/s  TR maxPG was 37 4 mmHg  MM measurements:  Unspecified Anatomy:   TAPSE was 1 6 cm  PW measurements:  Unspecified Anatomy:   E' Avg was 0 1 m/s  E' Lat was 0 1 m/s  E' Sept was 0 1 m/s  E/E' Avg was 7 7   E/E' Lat was 7   E/E' Sept was 8 6   MV A Shaheed was 0 7 m/s  MV Dec [place] was 5 8 m/s2  MV DecT was 139 3 ms   MV E Shaheed was 0 8  m/s  MV E/A Ratio was 1 2   HISTORY: PRIOR HISTORY: Ventricular arrhythmia  Risk factors: hypertension, diabetes, and medication-treated hypercholesterolemia  PROCEDURE: The procedure was performed at the bedside  This was a routine study  The transthoracic approach was used  The study included complete 2D imaging, M-mode, complete spectral Doppler, and color Doppler  Echocardiographic views  were limited due to decreased penetration and lung interference  This was a technically difficult study  LEFT VENTRICLE: Size was normal  Systolic function was at the lower limits of normal  Ejection fraction was estimated to be 45 %  There was mild diffuse hypokinesis  Wall thickness was normal  DOPPLER: Doppler parameters were consistent  with abnormal left ventricular relaxation (grade 1 diastolic dysfunction)      RIGHT VENTRICLE: The size was normal  Systolic function was normal  Wall thickness was normal     LEFT ATRIUM: The atrium was mildly dilated  RIGHT ATRIUM: The atrium was mildly dilated  MITRAL VALVE: There was mild annular calcification  There was normal leaflet separation  DOPPLER: The transmitral velocity was within the normal range  There was no evidence for stenosis  There was no regurgitation  AORTIC VALVE: The valve was trileaflet  Leaflets exhibited mildly increased thickness, mild calcification, normal cuspal separation, and sclerosis  DOPPLER: Transaortic velocity was within the normal range  There was no evidence for  stenosis  There was trace regurgitation  TRICUSPID VALVE: The valve structure was normal  There was normal leaflet separation  DOPPLER: The transtricuspid velocity was within the normal range  There was no evidence for stenosis  There was trace regurgitation  PULMONIC VALVE: Leaflets exhibited normal thickness, no calcification, and normal cuspal separation  DOPPLER: The transpulmonic velocity was within the normal range  There was no regurgitation  PERICARDIUM: There was no pericardial effusion  AORTA: The root exhibited mild dilatation, measuring upto 4 5 cm (1 9 cm/m2) at the proximal ascending aorta  SYSTEMIC VEINS: IVC: The inferior vena cava was normal in size and course  Respirophasic changes were normal     PULMONARY ARTERY: DOPPLER: Systolic pressure was mildly increased  Estimated peak pressure was 35 mmHg      SYSTEM MEASUREMENT TABLES    2D  %FS: 22 2 %  Ao Diam: 3 7 cm  EDV(Teich): 129 1 ml  EF(Teich): 44 5 %  ESV(Teich): 71 6 ml  IVSd: 1 cm  LA Diam: 4 6 cm  LAAs A2C: 30 4 cm2  LAAs A4C: 26 8 cm2  LAESV A-L A2C: 110 ml  LAESV A-L A4C: 89 6 ml  LAESV Index (A-L): 44 2 ml/m2  LAESV MOD A2C: 105 4 ml  LAESV MOD A4C: 83 5 ml  LAESV(A-L): 101 6 ml  LAESV(MOD BP): 95 8 ml  LAESVInd MOD BP: 41 6 ml/m2  LALs A2C: 7 1 cm  LALs A4C: 6 8 cm  LVIDd: 5 2 cm  LVIDs: 4 cm  LVPWd: 1 cm  RA Area: 23 7 cm2  RVIDd: 4 7 cm  RWT: 0 4  SV(Teich): 57 5 ml    CW  TR Vmax: 3 1 m/s  TR maxP 4 mmHg    MM  TAPSE: 1 6 cm    PW  E' Av 1 m/s  E' Lat: 0 1 m/s  E' Sept: 0 1 m/s  E/E' Av 7  E/E' Lat: 7  E/E' Sept: 8 6  MV A Shaheed: 0 7 m/s  MV Dec [place]: 5 8 m/s2  MV DecT: 139 3 ms  MV E Shaheed: 0 8 m/s  MV E/A Ratio: 1 2    [facility]    Prepared and electronically signed by    Tim Birmingham DO  Signed 10-Feb-2020 13:55:07       No results found for this or any previous visit  No results found for this or any previous visit  No results found for this or any previous visit             Scribe Attestation    I,:   Hafsa Reasons am acting as a scribe while in the presence of the attending physician :        I,:    personally performed the services described in this documentation    as scribed in my presence :

## 2020-03-18 ENCOUNTER — TELEPHONE (OUTPATIENT)
Dept: CARDIOLOGY CLINIC | Facility: CLINIC | Age: 70
End: 2020-03-18

## 2020-03-23 NOTE — TELEPHONE ENCOUNTER
Is this another one? He has one that has already been approved    Auth# [id number]  2/19/20-4/30/20

## 2020-04-24 ENCOUNTER — CLINICAL SUPPORT (OUTPATIENT)
Dept: CARDIOLOGY CLINIC | Facility: CLINIC | Age: 70
End: 2020-04-24
Payer: COMMERCIAL

## 2020-04-24 DIAGNOSIS — I47.2 VENTRICULAR TACHYCARDIA (HCC): ICD-10-CM

## 2020-04-24 PROCEDURE — 93228 REMOTE 30 DAY ECG REV/REPORT: CPT | Performed by: INTERNAL MEDICINE

## 2021-01-01 ENCOUNTER — TELEPHONE (OUTPATIENT)
Dept: UROLOGY | Age: 71
End: 2021-01-01

## 2021-01-01 ENCOUNTER — OFFICE VISIT (OUTPATIENT)
Dept: UROLOGY | Facility: MEDICAL CENTER | Age: 71
End: 2021-01-01
Payer: COMMERCIAL

## 2021-01-01 VITALS
SYSTOLIC BLOOD PRESSURE: 120 MMHG | BODY MASS INDEX: 31.14 KG/M2 | HEART RATE: 65 BPM | WEIGHT: 235 LBS | DIASTOLIC BLOOD PRESSURE: 70 MMHG | HEIGHT: 73 IN

## 2021-01-01 DIAGNOSIS — Z12.5 SPECIAL SCREENING FOR MALIGNANT NEOPLASM OF PROSTATE: ICD-10-CM

## 2021-01-01 DIAGNOSIS — N40.0 BPH WITHOUT OBSTRUCTION/LOWER URINARY TRACT SYMPTOMS: Primary | ICD-10-CM

## 2021-01-01 DIAGNOSIS — R97.20 ELEVATED PSA: ICD-10-CM

## 2021-01-01 LAB
SL AMB  POCT GLUCOSE, UA: ABNORMAL
SL AMB LEUKOCYTE ESTERASE,UA: ABNORMAL
SL AMB POCT BILIRUBIN,UA: ABNORMAL
SL AMB POCT BLOOD,UA: ABNORMAL
SL AMB POCT CLARITY,UA: CLEAR
SL AMB POCT COLOR,UA: YELLOW
SL AMB POCT KETONES,UA: ABNORMAL
SL AMB POCT NITRITE,UA: ABNORMAL
SL AMB POCT PH,UA: 5.5
SL AMB POCT SPECIFIC GRAVITY,UA: 1.02
SL AMB POCT URINE PROTEIN: ABNORMAL
SL AMB POCT UROBILINOGEN: 0.2

## 2021-01-01 PROCEDURE — 81003 URINALYSIS AUTO W/O SCOPE: CPT | Performed by: UROLOGY

## 2021-01-01 PROCEDURE — 99213 OFFICE O/P EST LOW 20 MIN: CPT | Performed by: UROLOGY

## 2021-01-01 RX ORDER — DOXYCYCLINE 100 MG/1
100 CAPSULE ORAL 2 TIMES DAILY
Qty: 28 CAPSULE | Refills: 1 | Status: SHIPPED | OUTPATIENT
Start: 2021-01-01 | End: 2021-01-01

## 2021-03-01 DIAGNOSIS — I46.9 CARDIAC ARREST WITH SUCCESSFUL RESUSCITATION (HCC): ICD-10-CM

## 2021-03-01 DIAGNOSIS — I48.0 PAROXYSMAL ATRIAL FIBRILLATION (HCC): ICD-10-CM

## 2021-03-01 RX ORDER — AMLODIPINE BESYLATE AND BENAZEPRIL HYDROCHLORIDE 5; 10 MG/1; MG/1
CAPSULE ORAL
Qty: 90 CAPSULE | Refills: 3 | Status: SHIPPED | OUTPATIENT
Start: 2021-03-01 | End: 2021-03-03

## 2021-03-03 DIAGNOSIS — I48.0 PAROXYSMAL ATRIAL FIBRILLATION (HCC): ICD-10-CM

## 2021-03-03 DIAGNOSIS — I46.9 CARDIAC ARREST WITH SUCCESSFUL RESUSCITATION (HCC): ICD-10-CM

## 2021-03-03 RX ORDER — AMLODIPINE BESYLATE AND BENAZEPRIL HYDROCHLORIDE 5; 10 MG/1; MG/1
CAPSULE ORAL
Qty: 90 CAPSULE | Refills: 3 | Status: SHIPPED | OUTPATIENT
Start: 2021-03-03

## 2021-08-06 NOTE — PROGRESS NOTES
Assessment/Plan:    BPH without obstruction/lower urinary tract symptoms  Mildly symptomatic  Content with the status quo  Reassess this problem annually  The patient's PCP can manage this for him  Elevated PSA  PSA mildly elevated  We will give him a course of antibiotics and reassess a total and free PSA in about a month  Diagnoses and all orders for this visit:    BPH without obstruction/lower urinary tract symptoms  -     POCT urine dip auto non-scope    Elevated PSA  -     doxycycline monohydrate (MONODOX) 100 mg capsule; Take 1 capsule (100 mg total) by mouth 2 (two) times a day for 14 days  -     PSA, total and free; Future    Special screening for malignant neoplasm of prostate  -     PSA, total and free; Future    Other orders  -     Misc Natural Products (NEURIVA PO); Take by mouth          Subjective:      Patient ID: Ivis Pink is a 70 y o  male  HPI  BPH:  He notes weak stream and nocturia x 2  He denies other significant urinary symptoms  He denies gross hematuria, urinary tract infections or incontinence  He is taking tamsulosin (Flomax) and Saw palmetto for his symptoms  PSA:  4 4 recently at [facility] on [address]   No  official report  Dr Jeison Pérez told pt he may have an infection  No sexual sx  AUA SYMPTOM SCORE      Most Recent Value   AUA SYMPTOM SCORE   How often have you had a sensation of not emptying your bladder completely after you finished urinating? 1   How often have you had to urinate again less than two hours after you finished urinating? 1   How often have you found you stopped and started again several times when you urinate?  0   How often have you found it difficult to postpone urination? 1   How often have you had a weak urinary stream?  2   How often have you had to push or strain to begin urination? 0   How many times did you most typically get up to urinate from the time you went to bed at night until the time you got up in the morning? 2   Quality of Life: If you were to spend the rest of your life with your urinary condition just the way it is now, how would you feel about that?  3   AUA SYMPTOM SCORE  7          The following portions of the patient's history were reviewed and updated as appropriate: allergies, current medications, past family history, past medical history, past social history, past surgical history and problem list     Review of Systems   Constitutional: Negative for activity change and fatigue  Respiratory: Negative for shortness of breath and wheezing  Cardiovascular: Negative for chest pain  Gastrointestinal: Negative for abdominal pain  Endocrine:        NIDDM  Does not monitor sugars  Genitourinary: Negative for difficulty urinating, dysuria, frequency, hematuria and urgency  Musculoskeletal: Negative for back pain and gait problem  Skin: Negative  Allergic/Immunologic: Negative  Neurological: Negative  Psychiatric/Behavioral: Negative  Objective:      /70   Pulse 65   Ht 6' 1" (1 854 m)   Wt 107 kg (235 lb)   BMI 31 00 kg/m²          Physical Exam  Constitutional:       Appearance: He is well-developed  HENT:      Head: Normocephalic and atraumatic  Pulmonary:      Effort: Pulmonary effort is normal    Genitourinary:     Rectum: Normal       Comments: The prostate is 40 grams, firm, smooth and non-tender  The right side seems somewhat larger than the left  Musculoskeletal:         General: Normal range of motion  Cervical back: Normal range of motion and neck supple  Skin:     General: Skin is warm and dry  Neurological:      Mental Status: He is alert and oriented to person, place, and time  Psychiatric:         Behavior: Behavior normal          Thought Content:  Thought content normal          Judgment: Judgment normal

## 2021-08-07 PROBLEM — R97.20 ELEVATED PSA: Status: ACTIVE | Noted: 2021-01-01

## 2021-08-07 NOTE — ASSESSMENT & PLAN NOTE
Mildly symptomatic  Content with the status quo  Reassess this problem annually  The patient's PCP can manage this for him

## 2022-01-01 ENCOUNTER — APPOINTMENT (EMERGENCY)
Dept: RADIOLOGY | Facility: HOSPITAL | Age: 72
End: 2022-01-01

## 2022-01-01 ENCOUNTER — HOSPITAL ENCOUNTER (EMERGENCY)
Facility: HOSPITAL | Age: 72
End: 2022-03-21
Attending: EMERGENCY MEDICINE

## 2022-01-01 VITALS
SYSTOLIC BLOOD PRESSURE: 252 MMHG | DIASTOLIC BLOOD PRESSURE: 127 MMHG | HEART RATE: 54 BPM | RESPIRATION RATE: 74 BRPM | OXYGEN SATURATION: 65 %

## 2022-01-01 DIAGNOSIS — I24.8: ICD-10-CM

## 2022-01-01 DIAGNOSIS — I46.9 CARDIAC ARREST (HCC): Primary | ICD-10-CM

## 2022-01-01 LAB
ALBUMIN SERPL BCP-MCNC: 3.5 G/DL (ref 3.5–5)
ALP SERPL-CCNC: 72 U/L (ref 46–116)
ALT SERPL W P-5'-P-CCNC: 177 U/L (ref 12–78)
ANION GAP SERPL CALCULATED.3IONS-SCNC: 21 MMOL/L (ref 4–13)
APTT PPP: 39 SECONDS (ref 23–37)
AST SERPL W P-5'-P-CCNC: 135 U/L (ref 5–45)
ATRIAL RATE: 154 BPM
BASOPHILS # BLD AUTO: 0.06 THOUSANDS/ΜL (ref 0–0.1)
BASOPHILS NFR BLD AUTO: 1 % (ref 0–1)
BILIRUB SERPL-MCNC: 0.46 MG/DL (ref 0.2–1)
BUN SERPL-MCNC: 30 MG/DL (ref 5–25)
CALCIUM SERPL-MCNC: 8.9 MG/DL (ref 8.3–10.1)
CARDIAC TROPONIN I PNL SERPL HS: 412 NG/L
CHLORIDE SERPL-SCNC: 101 MMOL/L (ref 100–108)
CHOLEST SERPL-MCNC: 139 MG/DL
CO2 SERPL-SCNC: 18 MMOL/L (ref 21–32)
CREAT SERPL-MCNC: 2.47 MG/DL (ref 0.6–1.3)
EOSINOPHIL # BLD AUTO: 0.13 THOUSAND/ΜL (ref 0–0.61)
EOSINOPHIL NFR BLD AUTO: 1 % (ref 0–6)
ERYTHROCYTE [DISTWIDTH] IN BLOOD BY AUTOMATED COUNT: 12.9 % (ref 11.6–15.1)
GFR SERPL CREATININE-BSD FRML MDRD: 25 ML/MIN/1.73SQ M
GLUCOSE SERPL-MCNC: 488 MG/DL (ref 65–140)
HCT VFR BLD AUTO: 47.2 % (ref 36.5–49.3)
HDLC SERPL-MCNC: 28 MG/DL
HGB BLD-MCNC: 14.9 G/DL (ref 12–17)
IMM GRANULOCYTES # BLD AUTO: 0.31 THOUSAND/UL (ref 0–0.2)
IMM GRANULOCYTES NFR BLD AUTO: 3 % (ref 0–2)
INR PPP: 1.42 (ref 0.84–1.19)
LDLC SERPL CALC-MCNC: 63 MG/DL (ref 0–100)
LYMPHOCYTES # BLD AUTO: 3.1 THOUSANDS/ΜL (ref 0.6–4.47)
LYMPHOCYTES NFR BLD AUTO: 25 % (ref 14–44)
MAGNESIUM SERPL-MCNC: 2.7 MG/DL (ref 1.6–2.6)
MCH RBC QN AUTO: 30.7 PG (ref 26.8–34.3)
MCHC RBC AUTO-ENTMCNC: 31.6 G/DL (ref 31.4–37.4)
MCV RBC AUTO: 97 FL (ref 82–98)
MONOCYTES # BLD AUTO: 1.05 THOUSAND/ΜL (ref 0.17–1.22)
MONOCYTES NFR BLD AUTO: 8 % (ref 4–12)
NEUTROPHILS # BLD AUTO: 7.78 THOUSANDS/ΜL (ref 1.85–7.62)
NEUTS SEG NFR BLD AUTO: 62 % (ref 43–75)
NONHDLC SERPL-MCNC: 111 MG/DL
NRBC BLD AUTO-RTO: 0 /100 WBCS
NT-PROBNP SERPL-MCNC: 1892 PG/ML
P AXIS: 59 DEGREES
PLATELET # BLD AUTO: 187 THOUSANDS/UL (ref 149–390)
PMV BLD AUTO: 9.9 FL (ref 8.9–12.7)
POTASSIUM SERPL-SCNC: 3.7 MMOL/L (ref 3.5–5.3)
PROT SERPL-MCNC: 6.4 G/DL (ref 6.4–8.2)
PROTHROMBIN TIME: 16.9 SECONDS (ref 11.6–14.5)
QRS AXIS: -50 DEGREES
QRSD INTERVAL: 242 MS
QT INTERVAL: 400 MS
QTC INTERVAL: 628 MS
RBC # BLD AUTO: 4.85 MILLION/UL (ref 3.88–5.62)
SODIUM SERPL-SCNC: 140 MMOL/L (ref 136–145)
T WAVE AXIS: 2 DEGREES
TRIGL SERPL-MCNC: 238 MG/DL
VENTRICULAR RATE: 148 BPM
WBC # BLD AUTO: 12.43 THOUSAND/UL (ref 4.31–10.16)

## 2022-01-01 PROCEDURE — 99285 EMERGENCY DEPT VISIT HI MDM: CPT

## 2022-01-01 PROCEDURE — 83880 ASSAY OF NATRIURETIC PEPTIDE: CPT | Performed by: EMERGENCY MEDICINE

## 2022-01-01 PROCEDURE — 80053 COMPREHEN METABOLIC PANEL: CPT | Performed by: EMERGENCY MEDICINE

## 2022-01-01 PROCEDURE — 84484 ASSAY OF TROPONIN QUANT: CPT | Performed by: EMERGENCY MEDICINE

## 2022-01-01 PROCEDURE — 93005 ELECTROCARDIOGRAM TRACING: CPT

## 2022-01-01 PROCEDURE — 92950 HEART/LUNG RESUSCITATION CPR: CPT

## 2022-01-01 PROCEDURE — 36415 COLL VENOUS BLD VENIPUNCTURE: CPT | Performed by: EMERGENCY MEDICINE

## 2022-01-01 PROCEDURE — 92950 HEART/LUNG RESUSCITATION CPR: CPT | Performed by: EMERGENCY MEDICINE

## 2022-01-01 PROCEDURE — 85025 COMPLETE CBC W/AUTO DIFF WBC: CPT | Performed by: EMERGENCY MEDICINE

## 2022-01-01 PROCEDURE — 83735 ASSAY OF MAGNESIUM: CPT | Performed by: EMERGENCY MEDICINE

## 2022-01-01 PROCEDURE — 99285 EMERGENCY DEPT VISIT HI MDM: CPT | Performed by: EMERGENCY MEDICINE

## 2022-01-01 PROCEDURE — 80061 LIPID PANEL: CPT | Performed by: EMERGENCY MEDICINE

## 2022-01-01 PROCEDURE — 85730 THROMBOPLASTIN TIME PARTIAL: CPT | Performed by: EMERGENCY MEDICINE

## 2022-01-01 PROCEDURE — 85610 PROTHROMBIN TIME: CPT | Performed by: EMERGENCY MEDICINE

## 2022-01-01 RX ORDER — HEPARIN SODIUM 1000 [USP'U]/ML
4000 INJECTION, SOLUTION INTRAVENOUS; SUBCUTANEOUS ONCE
Status: COMPLETED | OUTPATIENT
Start: 2022-01-01 | End: 2022-01-01

## 2022-01-01 RX ORDER — SODIUM BICARBONATE 0.5MEQ/ML
1 VIAL (ML) INTRAVENOUS ONCE
Status: DISCONTINUED | OUTPATIENT
Start: 2022-01-01 | End: 2022-01-01 | Stop reason: HOSPADM

## 2022-01-01 RX ORDER — EPINEPHRINE 0.1 MG/ML
5 SYRINGE (ML) INJECTION ONCE
Status: DISCONTINUED | OUTPATIENT
Start: 2022-01-01 | End: 2022-01-01 | Stop reason: HOSPADM

## 2022-01-01 RX ORDER — EPINEPHRINE 0.1 MG/ML
SYRINGE (ML) INJECTION CODE/TRAUMA/SEDATION MEDICATION
Status: COMPLETED | OUTPATIENT
Start: 2022-01-01 | End: 2022-01-01

## 2022-01-01 RX ORDER — AMIODARONE HYDROCHLORIDE 50 MG/ML
INJECTION, SOLUTION INTRAVENOUS
Status: DISCONTINUED
Start: 2022-01-01 | End: 2022-01-01 | Stop reason: HOSPADM

## 2022-01-01 RX ORDER — LIDOCAINE HCL 20 MG/ML
2 SYRINGE (ML) INTRAVENOUS ONCE
Status: DISCONTINUED | OUTPATIENT
Start: 2022-01-01 | End: 2022-01-01 | Stop reason: HOSPADM

## 2022-01-01 RX ADMIN — EPINEPHRINE 1 MG: 0.1 INJECTION, SOLUTION ENDOTRACHEAL; INTRACARDIAC; INTRAVENOUS at 01:00

## 2022-01-01 RX ADMIN — EPINEPHRINE 1 MG: 0.1 INJECTION, SOLUTION ENDOTRACHEAL; INTRACARDIAC; INTRAVENOUS at 01:09

## 2022-01-01 RX ADMIN — EPINEPHRINE 1 MG: 0.1 INJECTION, SOLUTION ENDOTRACHEAL; INTRACARDIAC; INTRAVENOUS at 01:13

## 2022-01-01 RX ADMIN — HEPARIN SODIUM 4000 UNITS: 1000 INJECTION INTRAVENOUS; SUBCUTANEOUS at 01:06

## 2022-01-01 RX ADMIN — EPINEPHRINE 1 MG: 0.1 INJECTION, SOLUTION ENDOTRACHEAL; INTRACARDIAC; INTRAVENOUS at 01:03

## 2022-01-01 RX ADMIN — EPINEPHRINE 1 MG: 0.1 INJECTION, SOLUTION ENDOTRACHEAL; INTRACARDIAC; INTRAVENOUS at 01:15

## 2022-03-21 NOTE — ED NOTES
Pt put into body bag with Ernestina Arce RN and Noe Willis RN  Pt has wedding band on finger while in secured body bag  Security notified to take body to ruben Dwyer RN  03/21/22 [id number]

## 2022-03-21 NOTE — ED NOTES
Spoke to [id number] willing to sign off on death certificate - per coroners office okay to proceed with our normal procedures - they will not come out        Pako Jaeger RN  03/21/22 [id number]

## 2022-03-21 NOTE — ED NOTES
[facility] [address] notified of death  Pt is considered a candidate for donation        Herbert Brennan RN  03/21/22 2083

## 2022-03-21 NOTE — ED NOTES
Pt was found by family to be altered at home  Witnessed event and patient went unconscious  When PD arrived to the scene they were unable to obtain pulses and started CPR  EMS arrived on scene and found patient to be in PEA   Pt received:   Defibrillation x5  5 epi  175 of lidocaine  1 bicarb  Blood Glucose 357    Pt intubated by EMS with an 8 0 ET tube, 26 at the lip   Prehospital IV access is 18G on LAC    Pt arrived in ED at 0054    0059: pt in PEA - compressions started  0100: 1mg epi  0101: pulse check - fine vfib   0102: resume compressions  0102: 1mg epi   0103: calcium   0104: pulse check - pulseless vtach  0104: defibrillation at 200 joules  0104: compressions resumed   0106: pulse check - pulseless vtach  0106: defibrillation at 200 joules  0107: compressions resumed   0107: 4000 units of heparin administered  0109: pulse check - vtach  0109: 1mg epi  0109: defibrillation at 360 joules  0109: compressions resumed  0111: pulse check - fine vfib   0112: ultrasound of heart performed by attending and Dr Hortencia Cota, no activity present on ultrasound  0112: compressions resumed  0113: 300mg amiodarone administered  0113: 1mg epi  0114: pulse check - fine vfib  0115: compressions resumed  0115: 1mg epi  0116: 1mg atropine  0117: pulse check - fine vfib   0117: resume compressions  0119: pulse check - asystole    TOD: 0119 - pronounced by Dr Ac Godfrey, RN  03/21/22 [id number]

## 2022-03-21 NOTE — ED NOTES
Medical records contacted with information about  patient as per policy        Medhat Orellana RN  [phone]

## 2022-03-21 NOTE — ED ATTENDING ATTESTATION
3/21/2022  IJamie DO, saw and evaluated the patient  I have discussed the patient with the resident/non-physician practitioner and agree with the resident's/non-physician practitioner's findings, Plan of Care, and MDM as documented in the resident's/non-physician practitioner's note, except where noted  All available labs and Radiology studies were reviewed  I was present for key portions of any procedure(s) performed by the resident/non-physician practitioner and I was immediately available to provide assistance  At this point I agree with the current assessment done in the Emergency Department  I have conducted an independent evaluation of this patient a history and physical is as follows:    ED Course     Pt seen and examined upon arrival   66 yo male with diabetes, who presents to the ER as cardiac arrest  Per EMS, patient was feeling well earlier today, told his family he did not feel well and went into the bathroom, short time later wife went to check on him and found him passed out and called for help  EMS called and police started CPR upon arrival   Upon EMS arrival, patient was found to be in VFib arrest     Patient had ongoing CPR, was cardioverted 5 times, given 5 rounds of epi, as well as 175mg lidocaine       Upon arrival to hospital, patient had regained pulses  Rhythm strips showed what appeared to be a STEMI  However, after bringing patient back to the room and placing on the monitor, patient was found to be in PEA arrest  EKG was unable to be obtained while patient had pulses  Compressions and ACLS were subsequently resumed  Ultimately, patient  at 0119  US showed no cardiac motion      Total downtime prior to CPR approximately 5 minutes  Total time in cardiac arrest approximately 45 minutes  Family notified,  called for the family       Final diagnoses:   Cardiac arrest [facility])   Acute coronary insufficiency [facility])         Critical Care Time  Procedures

## 2022-03-21 NOTE — ED PROVIDER NOTES
History  Chief Complaint   Patient presents with    Cardiac Arrest     Patient is a 26-year-old male, past medical history including diabetes, who presents to the emergency department in cardiac arrest   Per EMS, patient was not feeling well earlier today  He subsequently collapsed in front of family about 15 minutes prior to arrival to hospital (around [address])  Following patient's collapse, 911 was called  Compressions were started by police on their arrival   Upon EMS arrival, patient was found to be in VFib arrest     En route, patient was shocked 5 times, given 5 rounds of epi, as well as lidocaine  Upon arrival to hospital, patient had regained pulses  Rhythm strips showed what appeared to be a STEMI  However, after bringing patient back to the room and placing on the monitor, patient was found to be in PEA arrest   Compressions were subsequently resumed  EKG was unable to be obtained while patient had pulses  Patient underwent several rounds of CPR receiving multiple doses of epinephrine, 1 dose of amiodarone, 1 dose of bicarb, and 1 dose of calcium  Ultimately, patient  at 0119  Total downtime prior to CPR initiation approximately 5 minutes  History provided by:  EMS personnel  History limited by:  Patient unresponsive      None       No past medical history on file  No past surgical history on file  No family history on file  I have reviewed and agree with the history as documented  No existing history information found  No existing history information found    Social History     Tobacco Use    Smoking status: Not on file    Smokeless tobacco: Not on file   Substance Use Topics    Alcohol use: Not on file    Drug use: Not on file        Review of Systems   Unable to perform ROS: Patient unresponsive       Physical Exam  ED Triage Vitals   Temp Pulse Respirations Blood Pressure SpO2   -- 22 0104 22 0106 22 0103 22 0109    (!) 112 (!) 105 (!) 252/127 (!) 65 %      Temp src Heart Rate Source Patient Position - Orthostatic VS BP Location FiO2 (%)   -- -- -- -- --             Pain Score       --                    Orthostatic Vital Signs  Vitals:    03/21/22 0109 03/21/22 0112 03/21/22 0115 03/21/22 0118   BP:       Pulse: (!) 132 (!) 112 80 (!) 54       Physical Exam  Vitals and nursing note reviewed  Constitutional:       Appearance: He is obese  Comments: Unresponsive, intubated   HENT:      Right Ear: External ear normal       Left Ear: External ear normal       Nose: Nose normal    Eyes:      General: No scleral icterus  Comments: Pupils 5mm bilaterally, fixed   Cardiovascular:      Heart sounds: No murmur heard  No friction rub  No gallop  Pulmonary:      Breath sounds: No wheezing, rhonchi or rales  Abdominal:      General: There is no distension  Palpations: Abdomen is soft  Musculoskeletal:      Right lower leg: No edema  Left lower leg: No edema  Skin:     General: Skin is cool  Neurological:      Mental Status: He is unresponsive           ED Medications  Medications   EPINEPHrine (ADRENALIN) injection (1 mg Intravenous Given 3/21/22 0100)   heparin (porcine) injection 4,000 Units (4,000 Units Intravenous Given 3/21/22 0106)   EPINEPHrine (ADRENALIN) injection (1 mg Intravenous Given 3/21/22 0103)   EPINEPHrine (ADRENALIN) injection (1 mg Intravenous Given 3/21/22 0109)   EPINEPHrine (ADRENALIN) injection (1 mg Intravenous Given 3/21/22 0115)       Diagnostic Studies  Results Reviewed     Procedure Component Value Units Date/Time    Lipid panel [[id number]]  (Abnormal) Collected: 03/21/22 0105    Lab Status: Final result Specimen: Blood from Arm, Right Updated: 03/21/22 0254     Cholesterol 139 mg/dL      Triglycerides 238 mg/dL      HDL, Direct 28 mg/dL      LDL Calculated 63 mg/dL      Non-HDL-Chol (CHOL-HDL) 111 mg/dl     Magnesium [[id number]]  (Abnormal) Collected: 03/21/22 0105    Lab Status: Final result Specimen: Blood from Arm, Right Updated: 03/21/22 0142     Magnesium 2 7 mg/dL     NT-BNP PRO [[id number]]  (Abnormal) Collected: 03/21/22 0105    Lab Status: Final result Specimen: Blood from Arm, Right Updated: 03/21/22 0142     NT-proBNP 1,892 pg/mL     HS Troponin 0hr (reflex protocol) [[id number]]  (Abnormal) Collected: 03/21/22 0105    Lab Status: Final result Specimen: Blood from Arm, Right Updated: 03/21/22 0141     hs TnI 0hr 412 ng/L     Protime-INR [[id number]]  (Abnormal) Collected: 03/21/22 0105    Lab Status: Final result Specimen: Blood from Arm, Left Updated: 03/21/22 0136     Protime 16 9 seconds      INR 1 42    APTT [[id number]]  (Abnormal) Collected: 03/21/22 0105    Lab Status: Final result Specimen: Blood from Arm, Left Updated: 03/21/22 0136     PTT 39 seconds     Comprehensive metabolic panel [[id number]]  (Abnormal) Collected: 03/21/22 0105    Lab Status: Final result Specimen: Blood from Arm, Right Updated: 03/21/22 0134     Sodium 140 mmol/L      Potassium 3 7 mmol/L      Chloride 101 mmol/L      CO2 18 mmol/L      ANION GAP 21 mmol/L      BUN 30 mg/dL      Creatinine 2 47 mg/dL      Glucose 488 mg/dL      Calcium 8 9 mg/dL       U/L       U/L      Alkaline Phosphatase 72 U/L      Total Protein 6 4 g/dL      Albumin 3 5 g/dL      Total Bilirubin 0 46 mg/dL      eGFR 25 ml/min/1 73sq m     Narrative:      [place] guidelines for Chronic Kidney Disease (CKD):     Stage 1 with normal or high GFR (GFR > 90 mL/min/1 73 square meters)    Stage 2 Mild CKD (GFR = 60-89 mL/min/1 73 square meters)    Stage 3A Moderate CKD (GFR = 45-59 mL/min/1 73 square meters)    Stage 3B Moderate CKD (GFR = 30-44 mL/min/1 73 square meters)    Stage 4 Severe CKD (GFR = 15-29 mL/min/1 73 square meters)    Stage 5 End Stage CKD (GFR <15 mL/min/1 73 square meters)  Note: GFR calculation is accurate only with a steady state creatinine    CBC and differential [[id number]] (Abnormal) Collected: 22 0105    Lab Status: Final result Specimen: Blood from Arm, Right Updated: 22 0124     WBC 12 43 Thousand/uL      RBC 4 85 Million/uL      Hemoglobin 14 9 g/dL      Hematocrit 47 2 %      MCV 97 fL      MCH 30 7 pg      MCHC 31 6 g/dL      RDW 12 9 %      MPV 9 9 fL      Platelets 404 Thousands/uL      nRBC 0 /100 WBCs      Neutrophils Relative 62 %      Immat GRANS % 3 %      Lymphocytes Relative 25 %      Monocytes Relative 8 %      Eosinophils Relative 1 %      Basophils Relative 1 %      Neutrophils Absolute 7 78 Thousands/µL      Immature Grans Absolute 0 31 Thousand/uL      Lymphocytes Absolute 3 10 Thousands/µL      Monocytes Absolute 1 05 Thousand/µL      Eosinophils Absolute 0 13 Thousand/µL      Basophils Absolute 0 06 Thousands/µL     Narrative: This is an appended report  These results have been appended to a previously verified report  No orders to display         Procedures  Procedures      ED Course                                       MDM  Number of Diagnoses or Management Options  Acute coronary insufficiency [facility])  Cardiac arrest [facility])  Diagnosis management comments: Patient is a 67 y o  male who presents to the ED for cardiac arrest       Pulses briefly obtained on arrival to the hospital, but compressions resumed shortly after brining patient back to the room  After several rounds of CPR, patient  at 0119  Family was notified  Portions of the record may have been created with voice recognition software  Occasional wrong word or "sound a like" substitutions may have occurred due to the inherent limitations of voice recognition software  Read the chart carefully and recognize, using context, where substitutions have occurred           Amount and/or Complexity of Data Reviewed  Clinical lab tests: ordered  Decide to obtain previous medical records or to obtain history from someone other than the patient: yes  Obtain history from someone other than the patient: yes    Risk of Complications, Morbidity, and/or Mortality  Presenting problems: high  Management options: high    Patient Progress  Patient progress: other (comment) ()      Disposition  Final diagnoses:   Cardiac arrest ([facility] [address] )   Acute coronary insufficiency ([facility] [address] )     Time reflects when diagnosis was documented in both MDM as applicable and the Disposition within this note     Time User Action Codes Description Comment    3/21/2022  1:24 AM Vandana Garcia Add [I46 9] Cardiac arrest ([facility] [address] )     3/21/2022  1:45 AM Vandana Garcia Add [I24 8] Acute coronary insufficiency [facility])       ED Disposition     ED Disposition Condition Date/Time Comment      Mon Mar 21, 2022  1:24 AM       Follow-up Information    None       Date, Time and Cause of Death    Preliminary Cause of Death: Acute coronary insufficiency ([facility] [address] )       There are no discharge medications for this patient  No discharge procedures on file  PDMP Review     None           ED Provider  Attending physically available and evaluated [facility]  I managed the patient along with the ED Attending      Electronically Signed by         Silvia Zhou DO  [phone]

## 2022-03-21 NOTE — ED NOTES
Wife, Major Litten, does not have a current  home for patient  Wife provided with hospital card with contact info on who to contact in the next 36 hours        Tanya Guevara RN  [phone]

## 2022-11-28 NOTE — ASSESSMENT & PLAN NOTE
Improving  Close to baseline   Outpatient PCP follow-up within this week with repeat BMP    Recent Labs     02/10/20  0500 02/11/20  0456   BUN 37* 32*   CREATININE 1 78* 1 57* Left anterior descending lesion.

## 2023-08-10 NOTE — ED NOTES
Pt unresponsive  No pulse  CPR started at this time        Carson Beverage, RN  02/09/20 0636 Previously Declined (within the last year)

## 2023-09-06 NOTE — TELEPHONE ENCOUNTER
I left a voice mail message for the patient to schedule his Cystolithjolopaxy, Ureteroscopy w/Laser and  Stent  Polio vaccine given without incident in left lateral thigh. Patient will return in 1 month got dtap vaccine. And 2 months for pediarix

## (undated) DEVICE — LASER FIBER HOLMIUM 272MICRON

## (undated) DEVICE — TUBING SUCTION 5MM X 12 FT

## (undated) DEVICE — SCD SEQUENTIAL COMPRESSION COMFORT SLEEVE MEDIUM KNEE LENGTH: Brand: KENDALL SCD

## (undated) DEVICE — SPECIMEN CONTAINER STERILE PEEL PACK

## (undated) DEVICE — ENDOSCOPIC VALVE WITH ADAPTER.: Brand: SURSEAL® II

## (undated) DEVICE — UROCATCH BAG

## (undated) DEVICE — PACK TUR

## (undated) DEVICE — LUBRICANT SURGILUBE TUBE 4 OZ  FLIP TOP

## (undated) DEVICE — GLOVE SRG BIOGEL 7.5

## (undated) DEVICE — LASER HOLMUIUM FIBER 1000 MIC

## (undated) DEVICE — SHEATH URETERAL ACCESS 10/12FR 45CM PROXIS

## (undated) DEVICE — GUIDEWIRE STRGHT TIP 0.035 IN  SOLO PLUS